# Patient Record
Sex: MALE | Race: WHITE | NOT HISPANIC OR LATINO | Employment: OTHER | URBAN - METROPOLITAN AREA
[De-identification: names, ages, dates, MRNs, and addresses within clinical notes are randomized per-mention and may not be internally consistent; named-entity substitution may affect disease eponyms.]

---

## 2022-05-25 ENCOUNTER — OFFICE VISIT (OUTPATIENT)
Dept: FAMILY MEDICINE CLINIC | Facility: CLINIC | Age: 67
End: 2022-05-25

## 2022-05-25 VITALS
BODY MASS INDEX: 29.25 KG/M2 | HEART RATE: 77 BPM | TEMPERATURE: 97.8 F | HEIGHT: 68 IN | WEIGHT: 193 LBS | DIASTOLIC BLOOD PRESSURE: 72 MMHG | OXYGEN SATURATION: 98 % | SYSTOLIC BLOOD PRESSURE: 118 MMHG

## 2022-05-25 DIAGNOSIS — Z12.5 SCREENING FOR PROSTATE CANCER: ICD-10-CM

## 2022-05-25 DIAGNOSIS — Z76.89 ESTABLISHING CARE WITH NEW DOCTOR, ENCOUNTER FOR: Primary | ICD-10-CM

## 2022-05-25 DIAGNOSIS — R06.02 SOBOE (SHORTNESS OF BREATH ON EXERTION): ICD-10-CM

## 2022-05-25 DIAGNOSIS — Z12.11 SCREEN FOR COLON CANCER: ICD-10-CM

## 2022-05-25 DIAGNOSIS — R41.3 SHORT-TERM MEMORY LOSS: ICD-10-CM

## 2022-05-25 DIAGNOSIS — Z11.59 ENCOUNTER FOR HEPATITIS C SCREENING TEST FOR LOW RISK PATIENT: ICD-10-CM

## 2022-05-25 DIAGNOSIS — R94.31 ABNORMAL EKG: ICD-10-CM

## 2022-05-25 DIAGNOSIS — R00.2 PALPITATIONS: ICD-10-CM

## 2022-05-25 DIAGNOSIS — R53.83 FATIGUE, UNSPECIFIED TYPE: ICD-10-CM

## 2022-05-25 DIAGNOSIS — R26.89 BALANCE PROBLEMS: ICD-10-CM

## 2022-05-25 LAB
ALBUMIN SERPL-MCNC: 4.6 G/DL (ref 3.5–5.2)
ALBUMIN/GLOB SERPL: 2.3 G/DL
ALP SERPL-CCNC: 95 U/L (ref 39–117)
ALT SERPL W P-5'-P-CCNC: 31 U/L (ref 1–41)
ANION GAP SERPL CALCULATED.3IONS-SCNC: 11 MMOL/L (ref 5–15)
AST SERPL-CCNC: 21 U/L (ref 1–40)
BACTERIA UR QL AUTO: ABNORMAL /HPF
BILIRUB SERPL-MCNC: 0.6 MG/DL (ref 0–1.2)
BILIRUB UR QL STRIP: NEGATIVE
BUN SERPL-MCNC: 11 MG/DL (ref 8–23)
BUN/CREAT SERPL: 12.9 (ref 7–25)
CALCIUM SPEC-SCNC: 9.7 MG/DL (ref 8.6–10.5)
CHLORIDE SERPL-SCNC: 105 MMOL/L (ref 98–107)
CHOLEST SERPL-MCNC: 182 MG/DL (ref 0–200)
CLARITY UR: CLEAR
CO2 SERPL-SCNC: 25 MMOL/L (ref 22–29)
COLOR UR: YELLOW
CREAT SERPL-MCNC: 0.85 MG/DL (ref 0.76–1.27)
DEPRECATED RDW RBC AUTO: 41.8 FL (ref 37–54)
EGFRCR SERPLBLD CKD-EPI 2021: 96.4 ML/MIN/1.73
ERYTHROCYTE [DISTWIDTH] IN BLOOD BY AUTOMATED COUNT: 12.8 % (ref 12.3–15.4)
FOLATE SERPL-MCNC: 13.3 NG/ML (ref 4.78–24.2)
GLOBULIN UR ELPH-MCNC: 2 GM/DL
GLUCOSE SERPL-MCNC: 93 MG/DL (ref 65–99)
GLUCOSE UR STRIP-MCNC: NEGATIVE MG/DL
HCT VFR BLD AUTO: 44 % (ref 37.5–51)
HCV AB SER DONR QL: NORMAL
HDLC SERPL-MCNC: 46 MG/DL (ref 40–60)
HGB BLD-MCNC: 14.9 G/DL (ref 13–17.7)
HGB UR QL STRIP.AUTO: NEGATIVE
HYALINE CASTS UR QL AUTO: ABNORMAL /LPF
KETONES UR QL STRIP: NEGATIVE
LDLC SERPL CALC-MCNC: 117 MG/DL (ref 0–100)
LDLC/HDLC SERPL: 2.51 {RATIO}
LEUKOCYTE ESTERASE UR QL STRIP.AUTO: ABNORMAL
MCH RBC QN AUTO: 30.7 PG (ref 26.6–33)
MCHC RBC AUTO-ENTMCNC: 33.9 G/DL (ref 31.5–35.7)
MCV RBC AUTO: 90.7 FL (ref 79–97)
NITRITE UR QL STRIP: NEGATIVE
PH UR STRIP.AUTO: 6.5 [PH] (ref 5–8)
PLATELET # BLD AUTO: 233 10*3/MM3 (ref 140–450)
PMV BLD AUTO: 9.7 FL (ref 6–12)
POTASSIUM SERPL-SCNC: 4.1 MMOL/L (ref 3.5–5.2)
PROT SERPL-MCNC: 6.6 G/DL (ref 6–8.5)
PROT UR QL STRIP: NEGATIVE
PSA SERPL-MCNC: 2.25 NG/ML (ref 0–4)
RBC # BLD AUTO: 4.85 10*6/MM3 (ref 4.14–5.8)
RBC # UR STRIP: ABNORMAL /HPF
REF LAB TEST METHOD: ABNORMAL
SODIUM SERPL-SCNC: 141 MMOL/L (ref 136–145)
SP GR UR STRIP: 1.02 (ref 1–1.03)
SQUAMOUS #/AREA URNS HPF: ABNORMAL /HPF
TRIGL SERPL-MCNC: 102 MG/DL (ref 0–150)
TSH SERPL DL<=0.05 MIU/L-ACNC: 1.16 UIU/ML (ref 0.27–4.2)
UROBILINOGEN UR QL STRIP: ABNORMAL
VIT B12 BLD-MCNC: 542 PG/ML (ref 211–946)
VLDLC SERPL-MCNC: 19 MG/DL (ref 5–40)
WBC # UR STRIP: ABNORMAL /HPF
WBC NRBC COR # BLD: 6.38 10*3/MM3 (ref 3.4–10.8)

## 2022-05-25 PROCEDURE — 82746 ASSAY OF FOLIC ACID SERUM: CPT | Performed by: NURSE PRACTITIONER

## 2022-05-25 PROCEDURE — 93000 ELECTROCARDIOGRAM COMPLETE: CPT | Performed by: NURSE PRACTITIONER

## 2022-05-25 PROCEDURE — 81001 URINALYSIS AUTO W/SCOPE: CPT | Performed by: NURSE PRACTITIONER

## 2022-05-25 PROCEDURE — 82607 VITAMIN B-12: CPT | Performed by: NURSE PRACTITIONER

## 2022-05-25 PROCEDURE — 99204 OFFICE O/P NEW MOD 45 MIN: CPT | Performed by: NURSE PRACTITIONER

## 2022-05-25 PROCEDURE — 80053 COMPREHEN METABOLIC PANEL: CPT | Performed by: NURSE PRACTITIONER

## 2022-05-25 PROCEDURE — 84443 ASSAY THYROID STIM HORMONE: CPT | Performed by: NURSE PRACTITIONER

## 2022-05-25 PROCEDURE — 86803 HEPATITIS C AB TEST: CPT | Performed by: NURSE PRACTITIONER

## 2022-05-25 PROCEDURE — 36415 COLL VENOUS BLD VENIPUNCTURE: CPT | Performed by: NURSE PRACTITIONER

## 2022-05-25 PROCEDURE — 85027 COMPLETE CBC AUTOMATED: CPT | Performed by: NURSE PRACTITIONER

## 2022-05-25 PROCEDURE — G0103 PSA SCREENING: HCPCS | Performed by: NURSE PRACTITIONER

## 2022-05-25 PROCEDURE — 80061 LIPID PANEL: CPT | Performed by: NURSE PRACTITIONER

## 2022-05-25 NOTE — PROGRESS NOTES
Chief Complaint  Establish Care and Memory Loss    Subjective            Toy Marroquin presents to [facility]  Here for the establishment of care with PCP--    Then also memory issues--pt still has great LTM--then the STM issues were starting approx 2 yrs ago--    Their daughter passed away almost 5 yrs ago--    olest brother just Dx with dementia       PHQ-2 Total Score: 8  PHQ-9 Total Score: 8    Past Medical History:   Diagnosis Date   • History of meningitis    • HL (hearing loss)        No Known Allergies     Past Surgical History:   Procedure Laterality Date   • INNER EAR SURGERY Right         Social History     Tobacco Use   • Smoking status: Never Smoker   • Smokeless tobacco: Never Used   Vaping Use   • Vaping Use: Never used       Family History   Problem Relation Age of Onset   • Dementia Mother    • Cancer Mother    • Stroke Father    • Heart disease Father    • Dementia Brother    • Early death Daughter    • Cancer Daughter         Health Maintenance Due   Topic Date Due   • COLORECTAL CANCER SCREENING  Never done   • ANNUAL PHYSICAL  Never done   • COVID-19 Vaccine (4 - Booster for Moderna series) 04/12/2022   • HEPATITIS C SCREENING  Never done        No current outpatient medications on file prior to visit.     No current facility-administered medications on file prior to visit.       Immunization History   Administered Date(s) Administered   • COVID-19 (MODERNA) 1st, 2nd, 3rd Dose Only 03/25/2021, 04/22/2021, 12/12/2021       Review of Systems   Constitutional: Positive for appetite change and fatigue. Negative for activity change, chills and fever.        Food does not interest me    HENT: Negative.  Negative for trouble swallowing.         Nasally small holes    Eyes: Positive for blurred vision. Negative for double vision and visual disturbance.        Is supposed to wear glasses and not worn them in 3 yrs    Respiratory: Positive for shortness of breath.          "SOBOE    Cardiovascular: Positive for palpitations.        Intermittently at times --few weeks ago   Gastrointestinal: Positive for diarrhea. Negative for abdominal pain, blood in stool, constipation, nausea and vomiting.        Occasionally    Endocrine: Negative for polydipsia, polyphagia and polyuria.   Genitourinary: Negative for dysuria and nocturia.   Musculoskeletal: Positive for back pain. Negative for arthralgias and myalgias.        Intermittently at times --few weeks ago --upper back right side --with the palpitations    Skin: Negative for rash and wound.   Allergic/Immunologic: Positive for environmental allergies.   Neurological: Positive for memory problem and confusion. Negative for dizziness, tremors, seizures, syncope, light-headedness and headache.   Psychiatric/Behavioral: Positive for decreased concentration and depressed mood. Negative for behavioral problems, self-injury and suicidal ideas. The patient is nervous/anxious.         Objective     /72   Pulse 77   Temp 97.8 °F (36.6 °C)   Ht 172.7 cm (68\")   Wt 87.5 kg (193 lb)   SpO2 98%   BMI 29.35 kg/m²       Physical Exam  Vitals and nursing note reviewed. Exam conducted with a chaperone present (exwife).   Constitutional:       Appearance: Normal appearance.   HENT:      Head: Normocephalic.      Right Ear: Tympanic membrane normal.      Left Ear: Tympanic membrane normal.      Nose: Nose normal.      Mouth/Throat:      Mouth: Mucous membranes are moist.   Eyes:      Pupils: Pupils are equal, round, and reactive to light.   Cardiovascular:      Rate and Rhythm: Normal rate and regular rhythm.      Pulses:           Carotid pulses are 2+ on the right side and 2+ on the left side.     Heart sounds: Normal heart sounds. No murmur heard.  Pulmonary:      Effort: Pulmonary effort is normal.      Breath sounds: Normal breath sounds.   Abdominal:      General: Bowel sounds are normal.      Palpations: Abdomen is soft.   Musculoskeletal:   "       General: Normal range of motion.      Cervical back: Normal range of motion and neck supple.      Right lower leg: No edema.      Left lower leg: No edema.   Skin:     General: Skin is warm and dry.   Neurological:      Mental Status: He is alert and oriented to person, place, and time.      Sensory: Sensation is intact.      Motor: Motor function is intact.      Coordination: Romberg sign negative.      Gait: Gait is intact.   Psychiatric:         Mood and Affect: Mood normal.         Behavior: Behavior normal.         Thought Content: Thought content normal.      Comments: Patient passed both of the Mini-Mental exams         Result Review :             XR Chest PA & Lateral (In Office) (05/25/2022 10:05)  See Mini mental exam scanned in from today       ECG 12 Lead    Date/Time: 5/25/2022 10:24 AM  Performed by: Moni Erwin APRN  Authorized by: Moni Erwin APRN   Comparison: not compared with previous ECG   Previous ECG: no previous ECG available  Rhythm: sinus rhythm  Rate: normal  Conduction: left anterior fascicular block  ST Segments: ST segments normal  T Waves: T waves normal  QRS axis: normal  Other: no other findings    Clinical impression: abnormal EKG  Comments: Per EKG                 Assessment and Plan      Diagnoses and all orders for this visit:    1. Establishing care with new doctor, encounter for (Primary)    2. Short-term memory loss  -     TSH  -     PSA Screen  -     Vitamin B12 & Folate  -     Urinalysis With Culture If Indicated - Urine, Clean Catch  -     Hepatitis C Antibody  -     Comprehensive Metabolic Panel  -     Lipid Panel  -     CBC (No Diff)  -     CT Head Without Contrast; Future    3. Screen for colon cancer  -     Cologuard - Stool, Per Rectum; Future    4. Palpitations  -     ECG 12 Lead  -     XR Chest PA & Lateral (In Office)  -     TSH  -     Vitamin B12 & Folate  -     Urinalysis With Culture If Indicated - Urine, Clean Catch  -     Comprehensive  Metabolic Panel  -     Lipid Panel  -     CBC (No Diff)  -     Ambulatory Referral to Cardiology    5. SOBOE (shortness of breath on exertion)  -     ECG 12 Lead  -     XR Chest PA & Lateral (In Office)  -     TSH  -     Vitamin B12 & Folate  -     Urinalysis With Culture If Indicated - Urine, Clean Catch  -     Comprehensive Metabolic Panel  -     Lipid Panel  -     CBC (No Diff)  -     Ambulatory Referral to Cardiology    6. Fatigue, unspecified type  -     ECG 12 Lead  -     XR Chest PA & Lateral (In Office)  -     TSH  -     PSA Screen  -     Vitamin B12 & Folate  -     Urinalysis With Culture If Indicated - Urine, Clean Catch  -     Hepatitis C Antibody  -     Comprehensive Metabolic Panel  -     Lipid Panel  -     CBC (No Diff)  -     Ambulatory Referral to Cardiology    7. Encounter for hepatitis C screening test for low risk patient  -     Hepatitis C Antibody    8. Screening for prostate cancer  -     PSA Screen    9. Balance problems  -     CT Head Without Contrast; Future    10. Abnormal EKG  -     Ambulatory Referral to Cardiology        I spent 48 minutes caring for Toy on this date of service. This time includes time spent by me in the following activities:preparing for the visit, reviewing tests, obtaining and/or reviewing a separately obtained history, performing a medically appropriate examination and/or evaluation , counseling and educating the patient/family/caregiver, ordering medications, tests, or procedures, referring and communicating with other health care professionals , documenting information in the medical record and independently interpreting results and communicating that information with the patient/family/caregiver    Follow Up     Return in about 2 weeks (around 6/8/2022), or if symptoms worsen or fail to improve.

## 2022-05-25 NOTE — PROGRESS NOTES
Venipuncture Blood Specimen Collection  Venipuncture performed in LEFT ARM  by Alma Herman with good hemostasis. Patient tolerated the procedure well without complications.   05/25/22   Alma Herman

## 2022-05-25 NOTE — PROGRESS NOTES
Please mail letter to patient stating    Mr. Marroquin, the folic acid and the vitamin B12 were normal range the hepatitis C antibody screening was completely negative; thyroid levels were normal range; the PSA for the prostate cancer screening was in normal range; the urinalysis was all normal except trace leukocytes and microscopically just the trace leukocytes and no bacteria seen; the comprehensive panel reveals normal glucose and normal electrolytes and normal kidney and liver function tests; the cholesterol levels revealed total cholesterol triglycerides and HDL all normal range and then the LDL was modestly elevated at 117 it should be less than 100 when fasting; and lastly your blood counts were completely normal range

## 2022-06-07 ENCOUNTER — APPOINTMENT (OUTPATIENT)
Dept: CT IMAGING | Facility: HOSPITAL | Age: 67
End: 2022-06-07

## 2022-07-13 ENCOUNTER — APPOINTMENT (OUTPATIENT)
Dept: CT IMAGING | Facility: HOSPITAL | Age: 67
End: 2022-07-13

## 2022-07-14 ENCOUNTER — APPOINTMENT (OUTPATIENT)
Dept: CT IMAGING | Facility: HOSPITAL | Age: 67
End: 2022-07-14

## 2022-07-14 ENCOUNTER — HOSPITAL ENCOUNTER (OUTPATIENT)
Dept: CT IMAGING | Facility: HOSPITAL | Age: 67
Discharge: HOME | End: 2022-07-14
Admitting: NURSE PRACTITIONER

## 2022-07-14 DIAGNOSIS — R26.89 BALANCE PROBLEMS: ICD-10-CM

## 2022-07-14 DIAGNOSIS — R41.3 SHORT-TERM MEMORY LOSS: ICD-10-CM

## 2022-07-14 PROBLEM — R06.02 SOB (SHORTNESS OF BREATH): Status: ACTIVE | Noted: 2022-07-14

## 2022-07-14 PROBLEM — R00.2 PALPITATIONS: Status: ACTIVE | Noted: 2022-07-14

## 2022-07-14 PROCEDURE — 70450 CT HEAD/BRAIN W/O DYE: CPT

## 2022-07-14 NOTE — PROGRESS NOTES
Please mail letter to patient stating    Mr. Marroquin, the CT of the head shows no acute intracranial process--and there was an old lacunar infarct within the left cerebellum--but this was an old prior 1 --nothing new--please let me know if any of your memory issues persist and we can always get you referred to the neurologist for further evaluation

## 2022-07-14 NOTE — PROGRESS NOTES
"Chief Complaint  Shortness of Breath and Palpitations      History of Present Illness  Toy Marroquin presents to [facility]  Patient is a 67-year-old with a issue with memory loss over about the last few years as well as balance issues.  He has also had increased heart palpitations noticed in the last 6 months or so with some associated generalized fatigue.  He does report shortness of breath occurring both sporadically but also at times with exertion no lower extreme edema PND orthopnea.  He has not had any syncopal spells      Past Medical History:   Diagnosis Date   • History of meningitis    • HL (hearing loss)        No current outpatient medications on file.    There are no discontinued medications.  No Known Allergies     Social History     Tobacco Use   • Smoking status: Never Smoker   • Smokeless tobacco: Never Used   Vaping Use   • Vaping Use: Never used   Substance Use Topics   • Alcohol use: Yes     Comment: occ   • Drug use: Yes     Types: Marijuana       Family History   Problem Relation Age of Onset   • Dementia Mother    • Cancer Mother    • Stroke Father    • Heart disease Father    • Dementia Brother    • Early death Daughter    • Cancer Daughter         Objective     /95   Pulse 61   Ht 172.7 cm (68\")   Wt 87 kg (191 lb 12.8 oz)   BMI 29.16 kg/m²       Physical Exam    General Appearance:   · no acute distress  · Alert and oriented x3  HENT:   · lips not cyanotic  · Atraumatic  Neck:  · No jvd   · supple  Respiratory:  · no respiratory distress  · normal breath sounds  · no rales  Cardiovascular:  · Regular rate and rhythm  · no S3, no S4   · no murmur  · no rub  Extremities  · No cyanosis  · lower extremity edema: none    Skin:   · warm, dry  · No rashes    Result Review :     No results found for: PROBNP  CMP    CMP 5/25/22   Glucose 93   BUN 11   Creatinine 0.85   Sodium 141   Potassium 4.1   Chloride 105   Calcium 9.7   Albumin 4.60   Total Bilirubin " 0.6   Alkaline Phosphatase 95   AST (SGOT) 21   ALT (SGPT) 31           CBC w/diff    CBC w/Diff 5/25/22   WBC 6.38   RBC 4.85   Hemoglobin 14.9   Hematocrit 44.0   MCV 90.7   MCH 30.7   MCHC 33.9   RDW 12.8   Platelets 233            Lab Results   Component Value Date    TSH 1.160 05/25/2022      No results found for: FREET4   No results found for: DDIMERQUANT  No results found for: MG   No results found for: DIGOXIN   No results found for: TROPONINT   No results found for: POCTROP(       Lipid Panel    Lipid Panel 5/25/22   Total Cholesterol 182   Triglycerides 102   HDL Cholesterol 46   VLDL Cholesterol 19   LDL Cholesterol  117 (A)   LDL/HDL Ratio 2.51   (A) Abnormal value                   Chest x-ray 5/22 no acute disease      No results found for this or any previous visit.             The ASCVD Risk score (Spring CORRALES Jr., et al., 2013) failed to calculate for the following reasons:    The patient has a prior MI or stroke diagnosis     Diagnoses and all orders for this visit:    1. SOB (shortness of breath) (Primary)  Assessment & Plan:  Patient with intermittent symptoms suspect more from deconditioning checking echocardiogram for any evidence of CHF    Orders:  -     Adult Transthoracic Echo Complete W/ Cont if Necessary Per Protocol; Future  -     Holter Monitor - 24 Hour; Future    2. Palpitations  Assessment & Plan:  Patient with intermittent heart palpitations likely benign PACs or PVCs given his lacunar infarct would recommend 30-day event monitor to screen for atrial fibrillation as well as a echocardiogram.      3. Lacunar infarction (HCC)  Assessment & Plan:  Patient with old lacunar infarct in cerebellum seen on CT scan recommend chronic aspirin 81 mg once a day.  In addition discussed with patient possible statin treatment for goal LDL less than 70 he was going to follow-up with his PCP to discuss whether or not to start.  In additionwill check carotid duplex            Follow Up     No follow-ups on  file.          Patient was given instructions and counseling regarding his condition or for health maintenance advice. Please see specific information pulled into the AVS if appropriate.

## 2022-07-15 ENCOUNTER — OFFICE VISIT (OUTPATIENT)
Dept: CARDIOLOGY | Facility: CLINIC | Age: 67
End: 2022-07-15

## 2022-07-15 VITALS
HEIGHT: 68 IN | BODY MASS INDEX: 29.07 KG/M2 | HEART RATE: 61 BPM | WEIGHT: 191.8 LBS | SYSTOLIC BLOOD PRESSURE: 138 MMHG | DIASTOLIC BLOOD PRESSURE: 95 MMHG

## 2022-07-15 DIAGNOSIS — R06.02 SOB (SHORTNESS OF BREATH): Primary | ICD-10-CM

## 2022-07-15 DIAGNOSIS — R00.2 PALPITATIONS: ICD-10-CM

## 2022-07-15 DIAGNOSIS — I63.81 LACUNAR INFARCTION: ICD-10-CM

## 2022-07-15 PROCEDURE — 99204 OFFICE O/P NEW MOD 45 MIN: CPT | Performed by: INTERNAL MEDICINE

## 2022-07-15 NOTE — ASSESSMENT & PLAN NOTE
Patient with intermittent symptoms suspect more from deconditioning checking echocardiogram for any evidence of CHF

## 2022-07-15 NOTE — ASSESSMENT & PLAN NOTE
Patient with old lacunar infarct in cerebellum seen on CT scan recommend chronic aspirin 81 mg once a day.  In addition discussed with patient possible statin treatment for goal LDL less than 70 he was going to follow-up with his PCP to discuss whether or not to start.  In additionwill check carotid duplex

## 2022-07-15 NOTE — ASSESSMENT & PLAN NOTE
Patient with intermittent heart palpitations likely benign PACs or PVCs given his lacunar infarct would recommend 30-day event monitor to screen for atrial fibrillation as well as a echocardiogram.

## 2022-07-25 ENCOUNTER — OFFICE VISIT (OUTPATIENT)
Dept: FAMILY MEDICINE CLINIC | Facility: CLINIC | Age: 67
End: 2022-07-25

## 2022-07-25 VITALS
BODY MASS INDEX: 30.13 KG/M2 | HEIGHT: 67 IN | SYSTOLIC BLOOD PRESSURE: 126 MMHG | HEART RATE: 67 BPM | DIASTOLIC BLOOD PRESSURE: 72 MMHG | OXYGEN SATURATION: 97 % | TEMPERATURE: 96.7 F | WEIGHT: 192 LBS

## 2022-07-25 DIAGNOSIS — H54.7 VISION IMPAIRMENT: ICD-10-CM

## 2022-07-25 DIAGNOSIS — R51.9 INTERMITTENT HEADACHE: ICD-10-CM

## 2022-07-25 DIAGNOSIS — Z86.61 HISTORY OF MENINGITIS: ICD-10-CM

## 2022-07-25 DIAGNOSIS — R41.3 SHORT-TERM MEMORY LOSS: ICD-10-CM

## 2022-07-25 DIAGNOSIS — R26.89 BALANCE PROBLEMS: ICD-10-CM

## 2022-07-25 DIAGNOSIS — Z86.69 PERSONAL HISTORY OF HEARING LOSS: ICD-10-CM

## 2022-07-25 DIAGNOSIS — I63.81 LACUNAR INFARCTION: Primary | ICD-10-CM

## 2022-07-25 PROCEDURE — 99214 OFFICE O/P EST MOD 30 MIN: CPT | Performed by: NURSE PRACTITIONER

## 2022-07-25 RX ORDER — ASPIRIN 81 MG/1
81 TABLET ORAL DAILY
COMMUNITY

## 2022-07-25 NOTE — PROGRESS NOTES
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 and been to multiple ENT's in the past     And pt also needs the referral to the eye MD--and was told in the past to see an actual ophthalmologist --wore glasses in the past       PHQ-2 Total Score:    PHQ-9 Total Score:      Past Medical History:   Diagnosis Date   • History of meningitis    • HL (hearing loss)        No Known Allergies     Past Surgical History:   Procedure Laterality Date   • INNER EAR SURGERY Right         Social History     Tobacco Use   • Smoking status: Never Smoker   • Smokeless tobacco: Never Used   Vaping Use   • Vaping Use: Never used   Substance Use Topics   • Alcohol use: Yes     Comment: occ   • Drug use: Yes     Types: Marijuana       Family History   Problem Relation Age of Onset   • Dementia Mother    • Cancer Mother    • Stroke Father    • Heart disease Father    • Dementia Brother    • Early death Daughter    • Cancer Daughter         Health Maintenance Due   Topic Date Due   • COLORECTAL CANCER SCREENING  Never done   • COVID-19 Vaccine (4 - Booster for Moderna series) 04/12/2022   • ANNUAL WELLNESS VISIT  Never done        Current Outpatient Medications on File Prior to Visit   Medication Sig   • aspirin 81 MG EC tablet Take 81 mg by mouth Daily.     No current facility-administered medications on file prior to visit.       Immunization History   Administered Date(s) Administered   • COVID-19 (MODERNA) 1st, 2nd, 3rd Dose Only 03/25/2021, 04/22/2021, 12/12/2021       Review of Systems   Constitutional: Positive for fatigue.   HENT: Positive for hearing loss.  "Negative for trouble swallowing.         Hx of only \"one eardrum--and always had issues with balance\" --and the hearing loss--and pt reports prior Hx of the spinal meningitis at age 18 months old and then a \"draining of the TM\" -- right sided and then permanent hearing loss to that ear    Eyes: Negative for blurred vision and double vision.   Respiratory: Positive for shortness of breath. Negative for choking.         Pt reports not been too back lately    Cardiovascular: Negative for chest pain, palpitations and leg swelling.   Genitourinary: Negative for dysuria.   Musculoskeletal: Positive for arthralgias.        Left knee minimally    Neurological: Positive for headache and memory problem. Negative for dizziness, seizures and light-headedness.   Hematological: Does not bruise/bleed easily.   Psychiatric/Behavioral: Negative for self-injury, suicidal ideas and depressed mood. The patient is not nervous/anxious.         Objective     /72 (BP Location: Left arm, Patient Position: Sitting, Cuff Size: Adult)   Pulse 67   Temp 96.7 °F (35.9 °C) (Temporal)   Ht 170.2 cm (67\")   Wt 87.1 kg (192 lb)   SpO2 97%   BMI 30.07 kg/m²       Physical Exam  Vitals and nursing note reviewed. Exam conducted with a chaperone present (ex-wife ).   Constitutional:       Appearance: Normal appearance.   HENT:      Head: Normocephalic.      Right Ear: Tympanic membrane, ear canal and external ear normal.      Left Ear: Tympanic membrane, ear canal and external ear normal.      Ears:      Comments: permament hearing loss right side      Nose: Nose normal.      Mouth/Throat:      Comments: Wearing mask  Eyes:      Pupils: Pupils are equal, round, and reactive to light.   Cardiovascular:      Rate and Rhythm: Normal rate and regular rhythm.      Pulses:           Carotid pulses are 2+ on the right side and 2+ on the left side.     Heart sounds: Normal heart sounds. No murmur heard.  Pulmonary:      Effort: Pulmonary effort is " normal.      Breath sounds: Normal breath sounds.   Abdominal:      Palpations: Abdomen is soft.   Musculoskeletal:         General: Normal range of motion.      Cervical back: Normal range of motion and neck supple.      Right lower leg: No edema.      Left lower leg: No edema.   Skin:     General: Skin is warm and dry.   Neurological:      Mental Status: He is alert and oriented to person, place, and time.   Psychiatric:         Mood and Affect: Mood normal.         Behavior: Behavior normal.         Thought Content: Thought content normal.         Judgment: Judgment normal.         Result Review :             TSH (05/25/2022 10:13)  PSA Screen (05/25/2022 10:13)  Vitamin B12 & Folate (05/25/2022 10:13)  Urinalysis With Culture If Indicated - Urine, Clean Catch (05/25/2022 10:13)  Hepatitis C Antibody (05/25/2022 10:13)  Comprehensive Metabolic Panel (05/25/2022 10:13)  Lipid Panel (05/25/2022 10:13)  CBC (No Diff) (05/25/2022 10:13)  Urinalysis, Microscopic Only - Urine, Clean Catch (05/25/2022 10:13)  Cologuard - Stool, Per Rectum (06/13/2022)  Office Visit with Kvng Cummins MD (07/15/2022)  ECG 12 Lead (05/25/2022 10:24)               Assessment and Plan      Diagnoses and all orders for this visit:    1. Lacunar infarction (HCC) (Primary)  Comments:  past/old per CT scan  Orders:  -     Ambulatory Referral to Neurology    2. Short-term memory loss  -     Ambulatory Referral to Neurology    3. Intermittent headache  -     Ambulatory Referral to Neurology    4. Balance problems  -     Ambulatory Referral to Neurology    5. History of meningitis  Comments:  at 18 months old    6. Personal history of hearing loss  Comments:  at 18 months old     7. Vision impairment  -     Ambulatory Referral to Ophthalmology    then also the modest elevated LDL--and pt without any Hx of HTN or DM--I will refer the pt to neurology and they will further eval and then we will get the results from all cardiology is ordering--then if  statin indicated--they will start    Then upcoming tests are:  Adult Transthoracic Echo Complete W/ Cont if Necessary Per Protocol (07/15/2022 11:57)  Holter Monitor - 24 Hour (07/15/2022 11:57)  Duplex Carotid Ultrasound CAR (07/15/2022 12:20)  Cardiac Event Monitor (07/15/2022 12:20)           Follow Up     Return if symptoms worsen or fail to improve.

## 2022-08-29 ENCOUNTER — TELEPHONE (OUTPATIENT)
Dept: CARDIOLOGY | Facility: CLINIC | Age: 67
End: 2022-08-29

## 2022-08-31 ENCOUNTER — TELEPHONE (OUTPATIENT)
Dept: CARDIOLOGY | Facility: CLINIC | Age: 67
End: 2022-08-31

## 2022-08-31 NOTE — TELEPHONE ENCOUNTER
Patient called and wanted to know results.  We discussed results and patient stated understanding. No other issues or concerns noted currently per patient.    He was wanting to know when to follow up or if he needed to be seen sooner that 6 months?  Please advise.  Thank you.

## 2022-08-31 NOTE — TELEPHONE ENCOUNTER
----- Message from Shannon Dillard sent at 8/30/2022  2:29 PM EDT -----    ----- Message -----  From: Renuka Taylor APRN  Sent: 8/30/2022   2:03 PM EDT  To: Shannon Dillard    Notify pt that Carotid Doppler examination shows bilateral plaquing in the internal carotid arteries and common carotid arteries.  No significant carotid stenosis noted

## 2022-09-02 ENCOUNTER — TELEPHONE (OUTPATIENT)
Dept: CARDIOLOGY | Facility: CLINIC | Age: 67
End: 2022-09-02

## 2022-09-02 NOTE — TELEPHONE ENCOUNTER
----- Message from ADRIENNE Gallagher sent at 9/2/2022  9:42 AM EDT -----  Notify pt echo result: EF 62% and no valve disease noted, no cause found for shortness of breath. She can follow up in 6 months with KALEB DELEON or Dr Cummins

## 2022-09-02 NOTE — TELEPHONE ENCOUNTER
Left detailed voicemail with results and plan. Also ask that pt call back to schedule 6 month f/u jocelyn.

## 2022-09-13 ENCOUNTER — OFFICE VISIT (OUTPATIENT)
Dept: FAMILY MEDICINE CLINIC | Facility: CLINIC | Age: 67
End: 2022-09-13

## 2022-09-13 VITALS
BODY MASS INDEX: 29.51 KG/M2 | WEIGHT: 188 LBS | TEMPERATURE: 96.9 F | HEIGHT: 67 IN | OXYGEN SATURATION: 97 % | DIASTOLIC BLOOD PRESSURE: 64 MMHG | HEART RATE: 86 BPM | SYSTOLIC BLOOD PRESSURE: 128 MMHG

## 2022-09-13 DIAGNOSIS — R41.3 SHORT-TERM MEMORY LOSS: ICD-10-CM

## 2022-09-13 DIAGNOSIS — E78.2 MIXED HYPERLIPIDEMIA: Primary | ICD-10-CM

## 2022-09-13 DIAGNOSIS — I65.23 CAROTID ARTERY PLAQUE, BILATERAL: ICD-10-CM

## 2022-09-13 PROCEDURE — 99214 OFFICE O/P EST MOD 30 MIN: CPT | Performed by: NURSE PRACTITIONER

## 2022-09-13 RX ORDER — ATORVASTATIN CALCIUM 10 MG/1
10 TABLET, FILM COATED ORAL NIGHTLY
Qty: 90 TABLET | Refills: 0 | Status: SHIPPED | OUTPATIENT
Start: 2022-09-13 | End: 2022-12-15 | Stop reason: SDUPTHER

## 2022-09-13 NOTE — PROGRESS NOTES
Chief Complaint  Follow-up    Subjective            Toy Marroquin presents to [facility]  Patient and his ex-wife are here today with regards to the follow-up on the recent cardiology evaluation and they also did a recent carotid ultrasound and did show some carotid plaques but no stenosis    Patient brought in with him red rice capsules wondering if this would be helpful with his cholesterol as it was the only abnormal was the LDL at 117 everything else on his labs were completely normal    I did calculate his cardiovascular risk for related event over the next 10 years and surely because of his age alone and the LDL modestly elevated places him at just over 12% risk and I explained to him and his ex-wife that anyone 5% or higher should start a statin as well as a low-cholesterol diet--and they were very interested in starting this and waiting on the red rice capsules and I explained with regards to the follow-up at 3months --we will place the orders for the follow-up labs in early December he can follow-up the next week and we will see how well the lowest dose of Lipitor has affected the cholesterol    Patient also has an upcoming neurology appointment with regards to the family history of dementia and his onset of memory issues that began this entire work-up      PHQ-2 Total Score: 0  PHQ-9 Total Score: 0    Past Medical History:   Diagnosis Date   • History of meningitis    • HL (hearing loss)        No Known Allergies     Past Surgical History:   Procedure Laterality Date   • INNER EAR SURGERY Right         Social History     Tobacco Use   • Smoking status: Never Smoker   • Smokeless tobacco: Never Used   Vaping Use   • Vaping Use: Never used   Substance Use Topics   • Alcohol use: Yes     Comment: occ   • Drug use: Yes     Types: Marijuana       Family History   Problem Relation Age of Onset   • Dementia Mother    • Cancer Mother    • Stroke Father    • Heart disease Father   "  • Dementia Brother    • Early death Daughter    • Cancer Daughter         Health Maintenance Due   Topic Date Due   • COVID-19 Vaccine (4 - Booster for Moderna series) 04/12/2022   • ANNUAL WELLNESS VISIT  Never done        Current Outpatient Medications on File Prior to Visit   Medication Sig   • aspirin 81 MG EC tablet Take 81 mg by mouth Daily.     No current facility-administered medications on file prior to visit.       Immunization History   Administered Date(s) Administered   • COVID-19 (MODERNA) 1st, 2nd, 3rd Dose Only 03/25/2021, 04/22/2021, 12/12/2021       Review of Systems   Constitutional: Negative for chills, fatigue and fever.   HENT: Negative for trouble swallowing.    Eyes: Negative for blurred vision, double vision and visual disturbance.   Respiratory: Negative for choking and shortness of breath.    Cardiovascular: Negative for chest pain.   Gastrointestinal: Negative for abdominal pain and blood in stool.   Genitourinary: Negative for dysuria.   Musculoskeletal: Negative.    Skin: Negative.    Allergic/Immunologic: Positive for environmental allergies.   Neurological: Positive for headache and memory problem. Negative for dizziness, syncope and light-headedness.   Psychiatric/Behavioral: Negative for self-injury, suicidal ideas and depressed mood. The patient is nervous/anxious.         Objective     /64 (BP Location: Left arm, Patient Position: Sitting, Cuff Size: Adult)   Pulse 86   Temp 96.9 °F (36.1 °C) (Temporal)   Ht 170.2 cm (67\")   Wt 85.3 kg (188 lb)   SpO2 97%   BMI 29.44 kg/m²       Physical Exam  Vitals and nursing note reviewed. Exam conducted with a chaperone present (ex-wife).   Constitutional:       Appearance: Normal appearance.   HENT:      Head: Normocephalic.      Right Ear: External ear normal.      Left Ear: External ear normal.      Nose: Nose normal.      Mouth/Throat:      Comments: Wearing mask  Eyes:      Pupils: Pupils are equal, round, and reactive to " light.   Cardiovascular:      Rate and Rhythm: Normal rate and regular rhythm.      Pulses:           Carotid pulses are 2+ on the right side and 2+ on the left side.     Heart sounds: Normal heart sounds. No murmur heard.  Pulmonary:      Effort: Pulmonary effort is normal.      Breath sounds: Normal breath sounds.   Musculoskeletal:         General: Normal range of motion.      Cervical back: Normal range of motion and neck supple.      Right lower leg: No edema.      Left lower leg: No edema.   Skin:     General: Skin is warm and dry.   Neurological:      Mental Status: He is alert and oriented to person, place, and time.   Psychiatric:         Mood and Affect: Mood normal.         Behavior: Behavior normal.         Thought Content: Thought content normal.         Judgment: Judgment normal.         Result Review :           Duplex Carotid Ultrasound CAR (08/30/2022 13:34)  Lipid Panel (05/25/2022 10:13)                 Assessment and Plan      Diagnoses and all orders for this visit:    1. Mixed hyperlipidemia (Primary)  -     Comprehensive Metabolic Panel; Future  -     Lipid Panel; Future  -     atorvastatin (Lipitor) 10 MG tablet; Take 1 tablet by mouth Every Night.  Dispense: 90 tablet; Refill: 0    2. Carotid artery plaque, bilateral  -     Comprehensive Metabolic Panel; Future  -     Lipid Panel; Future  -     atorvastatin (Lipitor) 10 MG tablet; Take 1 tablet by mouth Every Night.  Dispense: 90 tablet; Refill: 0    3. Short-term memory loss  Comments:  pt seeing the neurologist and pt is taking the prenagen OTC--as he reports when he stopped this he could tell a difference and felt it helked     Patient reports very encouraged that one by one with all the testing things are checking out good and then he will proceed with the referred appointment with neurology for further evaluation regarding his memory    We will start the lowest dose of atorvastatin since his cardiovascular risk placed him at 12% over  the next 10 years for related event and we will recheck this lab early December and he will follow-up 1 week later to discuss the results and response to the atorvastatin-I did advise him to wait on starting an over-the-counter supplement for cholesterol such as this red rice capsule as if he had an allergic type reaction of some sort would not know which caused it        Follow Up     Return in about 3 months (around 12/13/2022), or if symptoms worsen or fail to improve, for Recheck, fasting labs and refills.

## 2022-10-05 ENCOUNTER — OFFICE VISIT (OUTPATIENT)
Dept: NEUROLOGY | Facility: CLINIC | Age: 67
End: 2022-10-05

## 2022-10-05 VITALS
HEIGHT: 67 IN | HEART RATE: 66 BPM | BODY MASS INDEX: 29.03 KG/M2 | DIASTOLIC BLOOD PRESSURE: 66 MMHG | SYSTOLIC BLOOD PRESSURE: 122 MMHG | WEIGHT: 185 LBS

## 2022-10-05 DIAGNOSIS — R41.3 MEMORY DIFFICULTY: Primary | ICD-10-CM

## 2022-10-05 DIAGNOSIS — I63.9 CEREBELLAR INFARCT: ICD-10-CM

## 2022-10-05 DIAGNOSIS — I63.81 LACUNAR INFARCTION: ICD-10-CM

## 2022-10-05 PROCEDURE — 99215 OFFICE O/P EST HI 40 MIN: CPT | Performed by: NURSE PRACTITIONER

## 2022-10-05 NOTE — PROGRESS NOTES
"Chief Complaint  Neurologic Problem    Subjective          Toy Marroquin presents to [facility]  History of Present Illness  He is being evaluated today for concerns regarding memory.  He feels that memory loss began 3 years ago.  Family Has noticed any memory difficulty.  Is having difficulty with short term memory.  Endorses difficulty handling financial affairs, such as balancing checkbook and paying bills timely.  Does Not feel as if he could learn a new tool, appliance or gadget.  Denies decreased interest in hobbies or activities.  Endorses depression.  Denies homicidal ideation and suicidal ideation.  Does experience repetitive questioning. Endorses difficulty with judgment and impulsivity    Does not live alone.  Does drive.  Endorses getting lost while driving    Endorses family history of Alzheimer's Disease, brother     States that his daughter passed away 5 years ago.  Significant other states that he gets confused about the day and sometimes the time.       Objective   Vital Signs:   /66   Pulse 66   Ht 170.2 cm (67\")   Wt 83.9 kg (185 lb)   BMI 28.98 kg/m²     Physical Exam  HENT:      Head: Normocephalic.   Pulmonary:      Effort: Pulmonary effort is normal.   Neurological:      Mental Status: He is alert and oriented to person, place, and time.      Cranial Nerves: Cranial nerves are intact.      Sensory: Sensation is intact.      Motor: Motor function is intact.      Coordination: Coordination is intact.      Deep Tendon Reflexes: Reflexes are normal and symmetric.        Neurologic Exam     Mental Status   Oriented to person, place, and time.        Result Review :             MoCA 28/30    CT Head:   There is no acute intracranial hemorrhage or extra-axial collection. The ventricles appear normal in caliber, with no evidence of mass effect or midline shift. The basal cisterns are patent. The gray-white differentiation is preserved.  There is " an old lacunar infarct in the left cerebellum.     The calvarium is intact. The paranasal sinuses and mastoid air cells are well-aerated.    Assessment and Plan    Diagnoses and all orders for this visit:    1. Memory difficulty (Primary)  Assessment & Plan:  Will order MRI brain for further evaluation of memory loss. If results inconclusive, will order formal neurocognitive evaluation.     Orders:  -     MRI Brain With & Without Contrast; Future    2. Cerebellar infarct (HCC)  -     MRI Brain With & Without Contrast; Future  -     CT Angiogram Head; Future  -     CT Angiogram Neck With & Without Contrast; Future    3. Lacunar infarction (HCC)  Assessment & Plan:  Left cerebellar lacunar infarct noted on CT head.  Will order MRI brain and CTA head.neck for further evaluation.  Continue aspirin/atorvastatin for secondary stroke risk reduction.       I spent 45 minutes caring for Toy on this date of service. This time includes time spent by me in the following activities:preparing for the visit, reviewing tests, obtaining and/or reviewing a separately obtained history, performing a medically appropriate examination and/or evaluation , counseling and educating the patient/family/caregiver, ordering medications, tests, or procedures, documenting information in the medical record and independently interpreting results and communicating that information with the patient/family/caregiver  Follow Up   Return in about 6 weeks (around 11/16/2022) for memory f/u, stroke f/u.  Patient was given instructions and counseling regarding his condition or for health maintenance advice. Please see specific information pulled into the AVS if appropriate.

## 2022-10-05 NOTE — PATIENT INSTRUCTIONS
Stroke Prevention  Some medical conditions and behaviors can lead to a higher chance of having a stroke. You can help prevent a stroke by eating healthy, exercising, not smoking, and managing any medical conditions you have.  Stroke is a leading cause of functional impairment. Primary prevention is particularly important because a majority of strokes are first-time events. Stroke changes the lives of not only those who experience a stroke but also their family and other caregivers.  How can this condition affect me?  A stroke is a medical emergency and should be treated right away. A stroke can lead to brain damage and can sometimes be life-threatening. If a person gets medical treatment right away, there is a better chance of surviving and recovering from a stroke.  What can increase my risk?  The following medical conditions may increase your risk of a stroke:  Cardiovascular disease.  High blood pressure (hypertension).  Diabetes.  High cholesterol.  Sickle cell disease.  Blood clotting disorders (hypercoagulable state).  Obesity.  Sleep disorders (obstructive sleep apnea).  Other risk factors include:  Being older than age 60.  Having a history of blood clots, stroke, or mini-stroke (transient ischemic attack, TIA).  Genetic factors, such as race, ethnicity, or a family history of stroke.  Smoking cigarettes or using other tobacco products.  Taking birth control pills, especially if you also use tobacco.  Heavy use of alcohol or drugs, especially cocaine and methamphetamine.  Physical inactivity.  What actions can I take to prevent this?  Manage your health conditions  High cholesterol levels.  Eating a healthy diet is important for preventing high cholesterol. If cholesterol cannot be managed through diet alone, you may need to take medicines.  Take any prescribed medicines to control your cholesterol as told by your health care provider.  Hypertension.  To reduce your risk of stroke, try to keep your blood  pressure below 130/80.  Eating a healthy diet and exercising regularly are important for controlling blood pressure. If these steps are not enough to manage your blood pressure, you may need to take medicines.  Take any prescribed medicines to control hypertension as told by your health care provider.  Ask your health care provider if you should monitor your blood pressure at home.  Have your blood pressure checked every year, even if your blood pressure is normal. Blood pressure increases with age and some medical conditions.  Diabetes.  Eating a healthy diet and exercising regularly are important parts of managing your blood sugar (glucose). If your blood sugar cannot be managed through diet and exercise, you may need to take medicines.  Take any prescribed medicines to control your diabetes as told by your health care provider.  Get evaluated for obstructive sleep apnea. Talk to your health care provider about getting a sleep evaluation if you snore a lot or have excessive sleepiness.  Make sure that any other medical conditions you have, such as atrial fibrillation or atherosclerosis, are managed.  Nutrition  Follow instructions from your health care provider about what to eat or drink to help manage your health condition. These instructions may include:  Reducing your daily calorie intake.  Limiting how much salt (sodium) you use to 1,500 milligrams (mg) each day.  Using only healthy fats for cooking, such as olive oil, canola oil, or sunflower oil.  Eating healthy foods. You can do this by:  Choosing foods that are high in fiber, such as whole grains, and fresh fruits and vegetables.  Eating at least 5 servings of fruits and vegetables a day. Try to fill one-half of your plate with fruits and vegetables at each meal.  Choosing lean protein foods, such as lean cuts of meat, poultry without skin, fish, tofu, beans, and nuts.  Eating low-fat dairy products.  Avoiding foods that are high in sodium. This can help  "lower blood pressure.  Avoiding foods that have saturated fat, trans fat, and cholesterol. This can help prevent high cholesterol.  Avoiding processed and prepared foods.  Counting your daily carbohydrate intake.    Lifestyle  If you drink alcohol:  Limit how much you have to:  0-1 drink a day for women who are not pregnant.  0-2 drinks a day for men.  Know how much alcohol is in your drink. In the [place] one drink equals one 12 oz bottle of beer (355mL), one 5 oz glass of wine (148mL), or one 1½ oz glass of hard liquor (44mL).  Do not use any products that contain nicotine or tobacco. These products include cigarettes, chewing tobacco, and vaping devices, such as e-cigarettes. If you need help quitting, ask your health care provider.  Avoid secondhand smoke.  Do not use drugs.  Activity    Try to stay at a healthy weight.  Get at least 30 minutes of exercise on most days, such as:  Fast walking.  Biking.  Swimming.  Medicines  Take over-the-counter and prescription medicines only as told by your health care provider. Aspirin or blood thinners (antiplatelets or anticoagulants) may be recommended to reduce your risk of forming blood clots that can lead to stroke.  Avoid taking birth control pills. Talk to your health care provider about the risks of taking birth control pills if:  You are over 35 years old.  You smoke.  You get very bad headaches.  You have had a blood clot.  Where to find more information  American Stroke Association: www.strokeassociation.org  Get help right away if:  You or a loved one has any symptoms of a stroke. \"BE FAST\" is an easy way to remember the main warning signs of a stroke:  B - Balance. Signs are dizziness, sudden trouble walking, or loss of balance.  E - Eyes. Signs are trouble seeing or a sudden change in vision.  F - Face. Signs are sudden weakness or numbness of the face, or the face or eyelid drooping on one side.  A - Arms. Signs are weakness or numbness in an arm. This happens " "suddenly and usually on one side of the body.  S - Speech. Signs are sudden trouble speaking, slurred speech, or trouble understanding what people say.  T - Time. Time to call emergency services. Write down what time symptoms started.  You or a loved one has other signs of a stroke, such as:  A sudden, severe headache with no known cause.  Nausea or vomiting.  Seizure.  These symptoms may represent a serious problem that is an emergency. Do not wait to see if the symptoms will go away. Get medical help right away. Call your local emergency services (911 in the [place] Do not drive yourself to the hospital.  Summary  You can help to prevent a stroke by eating healthy, exercising, not smoking, limiting alcohol intake, and managing any medical conditions you may have.  Do not use any products that contain nicotine or tobacco. These include cigarettes, chewing tobacco, and vaping devices, such as e-cigarettes. If you need help quitting, ask your health care provider.  Remember \"BE FAST\" for warning signs of a stroke. Get help right away if you or a loved one has any of these signs.  This information is not intended to replace advice given to you by your health care provider. Make sure you discuss any questions you have with your health care provider.  Document Revised: 07/19/2021 Document Reviewed: 07/19/2021  Elsevier Patient Education © 2022 [employer]    "

## 2022-10-07 NOTE — ASSESSMENT & PLAN NOTE
Left cerebellar lacunar infarct noted on CT head.  Will order MRI brain and CTA head.neck for further evaluation.  Continue aspirin/atorvastatin for secondary stroke risk reduction.

## 2022-10-07 NOTE — ASSESSMENT & PLAN NOTE
Will order MRI brain for further evaluation of memory loss. If results inconclusive, will order formal neurocognitive evaluation.

## 2022-10-31 ENCOUNTER — HOSPITAL ENCOUNTER (OUTPATIENT)
Dept: CT IMAGING | Facility: HOSPITAL | Age: 67
Discharge: HOME | End: 2022-10-31

## 2022-10-31 ENCOUNTER — HOSPITAL ENCOUNTER (OUTPATIENT)
Dept: MRI IMAGING | Facility: HOSPITAL | Age: 67
Discharge: HOME | End: 2022-10-31

## 2022-10-31 DIAGNOSIS — I63.9 CEREBELLAR INFARCT: ICD-10-CM

## 2022-10-31 DIAGNOSIS — R41.3 MEMORY DIFFICULTY: ICD-10-CM

## 2022-10-31 LAB
CREAT BLDA-MCNC: 0.9 MG/DL
EGFRCR SERPLBLD CKD-EPI 2021: 94.8 ML/MIN/1.73

## 2022-10-31 PROCEDURE — 0 IOPAMIDOL PER 1 ML: Performed by: NURSE PRACTITIONER

## 2022-10-31 PROCEDURE — 0 GADOBENATE DIMEGLUMINE 529 MG/ML SOLUTION: Performed by: NURSE PRACTITIONER

## 2022-10-31 PROCEDURE — 70496 CT ANGIOGRAPHY HEAD: CPT

## 2022-10-31 PROCEDURE — 70553 MRI BRAIN STEM W/O & W/DYE: CPT

## 2022-10-31 PROCEDURE — A9577 INJ MULTIHANCE: HCPCS | Performed by: NURSE PRACTITIONER

## 2022-10-31 PROCEDURE — 70498 CT ANGIOGRAPHY NECK: CPT

## 2022-10-31 PROCEDURE — 82565 ASSAY OF CREATININE: CPT

## 2022-10-31 RX ADMIN — GADOBENATE DIMEGLUMINE 18 ML: 529 INJECTION, SOLUTION INTRAVENOUS at 15:54

## 2022-10-31 RX ADMIN — IOPAMIDOL 100 ML: 755 INJECTION, SOLUTION INTRAVENOUS at 15:06

## 2022-12-14 DIAGNOSIS — I65.23 CAROTID ARTERY PLAQUE, BILATERAL: ICD-10-CM

## 2022-12-14 DIAGNOSIS — E78.2 MIXED HYPERLIPIDEMIA: ICD-10-CM

## 2022-12-14 RX ORDER — ATORVASTATIN CALCIUM 10 MG/1
10 TABLET, FILM COATED ORAL NIGHTLY
Qty: 90 TABLET | Refills: 0 | OUTPATIENT
Start: 2022-12-14

## 2022-12-15 ENCOUNTER — OFFICE VISIT (OUTPATIENT)
Dept: FAMILY MEDICINE CLINIC | Facility: CLINIC | Age: 67
End: 2022-12-15

## 2022-12-15 VITALS
TEMPERATURE: 97.1 F | BODY MASS INDEX: 29.35 KG/M2 | SYSTOLIC BLOOD PRESSURE: 124 MMHG | HEIGHT: 67 IN | HEART RATE: 74 BPM | OXYGEN SATURATION: 96 % | WEIGHT: 187 LBS | DIASTOLIC BLOOD PRESSURE: 68 MMHG

## 2022-12-15 DIAGNOSIS — I65.23 CAROTID ARTERY PLAQUE, BILATERAL: ICD-10-CM

## 2022-12-15 DIAGNOSIS — Z00.00 MEDICARE ANNUAL WELLNESS VISIT, SUBSEQUENT: Primary | ICD-10-CM

## 2022-12-15 DIAGNOSIS — E78.2 MIXED HYPERLIPIDEMIA: ICD-10-CM

## 2022-12-15 PROCEDURE — 1160F RVW MEDS BY RX/DR IN RCRD: CPT | Performed by: NURSE PRACTITIONER

## 2022-12-15 PROCEDURE — G0402 INITIAL PREVENTIVE EXAM: HCPCS | Performed by: NURSE PRACTITIONER

## 2022-12-15 PROCEDURE — 1170F FXNL STATUS ASSESSED: CPT | Performed by: NURSE PRACTITIONER

## 2022-12-15 RX ORDER — ATORVASTATIN CALCIUM 10 MG/1
10 TABLET, FILM COATED ORAL NIGHTLY
Qty: 90 TABLET | Refills: 1 | Status: SHIPPED | OUTPATIENT
Start: 2022-12-15

## 2022-12-16 ENCOUNTER — CLINICAL SUPPORT (OUTPATIENT)
Dept: FAMILY MEDICINE CLINIC | Facility: CLINIC | Age: 67
End: 2022-12-16

## 2022-12-16 DIAGNOSIS — I65.23 CAROTID ARTERY PLAQUE, BILATERAL: ICD-10-CM

## 2022-12-16 DIAGNOSIS — E78.2 MIXED HYPERLIPIDEMIA: ICD-10-CM

## 2022-12-16 LAB
ALBUMIN SERPL-MCNC: 4 G/DL (ref 3.5–5.2)
ALBUMIN/GLOB SERPL: 1.8 G/DL
ALP SERPL-CCNC: 98 U/L (ref 39–117)
ALT SERPL W P-5'-P-CCNC: 22 U/L (ref 1–41)
ANION GAP SERPL CALCULATED.3IONS-SCNC: 6.8 MMOL/L (ref 5–15)
AST SERPL-CCNC: 18 U/L (ref 1–40)
BILIRUB SERPL-MCNC: 0.3 MG/DL (ref 0–1.2)
BUN SERPL-MCNC: 10 MG/DL (ref 8–23)
BUN/CREAT SERPL: 11.5 (ref 7–25)
CALCIUM SPEC-SCNC: 9.4 MG/DL (ref 8.6–10.5)
CHLORIDE SERPL-SCNC: 107 MMOL/L (ref 98–107)
CHOLEST SERPL-MCNC: 132 MG/DL (ref 0–200)
CO2 SERPL-SCNC: 26.2 MMOL/L (ref 22–29)
CREAT SERPL-MCNC: 0.87 MG/DL (ref 0.76–1.27)
EGFRCR SERPLBLD CKD-EPI 2021: 95.8 ML/MIN/1.73
GLOBULIN UR ELPH-MCNC: 2.2 GM/DL
GLUCOSE SERPL-MCNC: 76 MG/DL (ref 65–99)
HDLC SERPL-MCNC: 51 MG/DL (ref 40–60)
LDLC SERPL CALC-MCNC: 69 MG/DL (ref 0–100)
LDLC/HDLC SERPL: 1.38 {RATIO}
POTASSIUM SERPL-SCNC: 4.7 MMOL/L (ref 3.5–5.2)
PROT SERPL-MCNC: 6.2 G/DL (ref 6–8.5)
SODIUM SERPL-SCNC: 140 MMOL/L (ref 136–145)
TRIGL SERPL-MCNC: 54 MG/DL (ref 0–150)
VLDLC SERPL-MCNC: 12 MG/DL (ref 5–40)

## 2022-12-16 PROCEDURE — 80053 COMPREHEN METABOLIC PANEL: CPT | Performed by: NURSE PRACTITIONER

## 2022-12-16 PROCEDURE — 80061 LIPID PANEL: CPT | Performed by: NURSE PRACTITIONER

## 2022-12-16 PROCEDURE — 36415 COLL VENOUS BLD VENIPUNCTURE: CPT | Performed by: NURSE PRACTITIONER

## 2022-12-16 NOTE — PROGRESS NOTES
Venipuncture Blood Specimen Collection  Venipuncture performed in left arm by Alissa Avalos with good hemostasis. Patient tolerated the procedure well without complications.   12/16/22   Alissa Avalos

## 2022-12-19 NOTE — PROGRESS NOTES
Please mail letter to patient stating    Mr. Marroquin, comprehensive panel shows normal glucose and normal kidney and liver function tests and normal electrolytes and your cholesterol panel was perfect

## 2022-12-20 ENCOUNTER — OFFICE VISIT (OUTPATIENT)
Dept: NEUROLOGY | Facility: CLINIC | Age: 67
End: 2022-12-20

## 2022-12-20 VITALS
HEIGHT: 67 IN | SYSTOLIC BLOOD PRESSURE: 147 MMHG | DIASTOLIC BLOOD PRESSURE: 72 MMHG | BODY MASS INDEX: 30.13 KG/M2 | WEIGHT: 192 LBS | HEART RATE: 68 BPM

## 2022-12-20 DIAGNOSIS — R41.3 MEMORY DIFFICULTY: Primary | ICD-10-CM

## 2022-12-20 PROCEDURE — 99214 OFFICE O/P EST MOD 30 MIN: CPT | Performed by: NURSE PRACTITIONER

## 2022-12-20 NOTE — PROGRESS NOTES
"Chief Complaint  Neurologic Problem    Subjective          Toy Marroquin presents to [facility]  History of Present Illness  Following up for memory.  States he feels he's continuing to have significant short term memory loss. Endorses getting confused about the day and time.  also agrees that he's experiencing cognitive decline.       Interval History:   He is being evaluated today for concerns regarding memory.  He feels that memory loss began 3 years ago.  Family Has noticed any memory difficulty.  Is having difficulty with short term memory.  Endorses difficulty handling financial affairs, such as balancing checkbook and paying bills timely.  Does Not feel as if he could learn a new tool, appliance or gadget.  Denies decreased interest in hobbies or activities.  Endorses depression.  Denies homicidal ideation and suicidal ideation.  Does experience repetitive questioning. Endorses difficulty with judgment and impulsivity    Does not live alone.  Does drive.  Endorses getting lost while driving    Endorses family history of Alzheimer's Disease, brother     States that his daughter passed away 5 years ago.  Significant other states that he gets confused about the day and sometimes the time.       Objective   Vital Signs:   /72   Pulse 68   Ht 170.2 cm (67\")   Wt 87.1 kg (192 lb)   BMI 30.07 kg/m²     Physical Exam  HENT:      Head: Normocephalic.   Pulmonary:      Effort: Pulmonary effort is normal.   Neurological:      Mental Status: He is alert and oriented to person, place, and time.      Cranial Nerves: Cranial nerves are intact.      Sensory: Sensation is intact.      Motor: Motor function is intact.      Coordination: Coordination is intact.      Deep Tendon Reflexes: Reflexes are normal and symmetric.        Neurologic Exam     Mental Status   Oriented to person, place, and time.        Result Review :   CMP:  Lab Results   Component Value Date "    BUN 10 12/16/2022    CREATININE 0.87 12/16/2022     12/16/2022    K 4.7 12/16/2022     12/16/2022    CALCIUM 9.4 12/16/2022    ALBUMIN 4.00 12/16/2022    BILITOT 0.3 12/16/2022    ALKPHOS 98 12/16/2022    AST 18 12/16/2022    ALT 22 12/16/2022     LIPID PANEL:  Lab Results   Component Value Date    TRIG 54 12/16/2022    HDL 51 12/16/2022    VLDL 12 12/16/2022    LDL 69 12/16/2022    LDLHDL 1.38 12/16/2022            MoCA: 28/30    MRI Brain:   The ventricles are normal in size, position, and configuration.  Sulci are not abnormally   prominent.     Scattered tiny foci of nonspecific T2 bright signal in cerebral white matter are consistent with   mild small vessel disease and/or scattered lacunar infarcts.     No abnormal bright signal is seen on diffusion weighted images.  No restricted diffusion is   evident.     No abnormal dark signal is seen on magnetic susceptibility sequence sensitive for blood products.     No abnormal patterns of enhancement are seen.     No abnormality of the pituitary or orbits is evident.     The paranasal sinuses are well aerated.  No abnormal mastoid signal is seen.      CTA head:   Major arterial vasculature within head appears widely patent, with no evidence of thrombus or aneurysm.    CTA Neck:    Major arterial vasculature within neck appears widely patent, with no hemodynamically significant stenosis or dissection     Assessment and Plan    Diagnoses and all orders for this visit:    1. Memory difficulty (Primary)  Assessment & Plan:  MRI brain shows age appropriate white matter disease and is otherwise unrevealing.  CTA head/neck shows no significant vascular stenosis or occlusive disease.  MoCA was intact at 28/30.  Cognitive labs WNL.  Due to inconsistency between reported memory difficulty and test results, will order neurocognitive testing.     Orders:  -     Ambulatory Referral to Neuropsychology      Follow Up   Return in about 4 months (around 4/20/2023) for  memory f/u.  Patient was given instructions and counseling regarding his condition or for health maintenance advice. Please see specific information pulled into the AVS if appropriate.

## 2022-12-21 PROBLEM — I63.81 LACUNAR INFARCTION: Status: RESOLVED | Noted: 2022-07-15 | Resolved: 2022-12-21

## 2022-12-21 NOTE — ASSESSMENT & PLAN NOTE
MRI brain shows age appropriate white matter disease and is otherwise unrevealing.  CTA head/neck shows no significant vascular stenosis or occlusive disease.  MoCA was intact at 28/30.  Cognitive labs WNL.  Due to inconsistency between reported memory difficulty and test results, will order neurocognitive testing.

## 2023-03-14 ENCOUNTER — OFFICE VISIT (OUTPATIENT)
Dept: FAMILY MEDICINE CLINIC | Facility: CLINIC | Age: 68
End: 2023-03-14
Payer: MEDICARE

## 2023-03-14 VITALS
HEIGHT: 67 IN | DIASTOLIC BLOOD PRESSURE: 80 MMHG | WEIGHT: 185 LBS | TEMPERATURE: 97.4 F | HEART RATE: 84 BPM | BODY MASS INDEX: 29.03 KG/M2 | OXYGEN SATURATION: 97 % | SYSTOLIC BLOOD PRESSURE: 136 MMHG

## 2023-03-14 DIAGNOSIS — R41.3 MEMORY DIFFICULTY: Primary | ICD-10-CM

## 2023-03-14 DIAGNOSIS — M50.30 DDD (DEGENERATIVE DISC DISEASE), CERVICAL: ICD-10-CM

## 2023-03-14 DIAGNOSIS — M54.2 NECK PAIN OF OVER 3 MONTHS DURATION: ICD-10-CM

## 2023-03-14 PROCEDURE — 99213 OFFICE O/P EST LOW 20 MIN: CPT | Performed by: NURSE PRACTITIONER

## 2023-03-14 NOTE — PROGRESS NOTES
Please mail letter to patient stating    Toy the x-rays of the cervical spine do show that at the C5-C6 level there is moderate degenerative disc disease and there is on the left side of that disc area some spurring--everything else shows well-preserved disc spaces and good normal alignment overall--should anything worsen or new symptoms start please follow-up in the office with me so we can further investigate and evaluate

## 2023-03-14 NOTE — PROGRESS NOTES
Chief Complaint  Hyperlipidemia (Patient is here for 3 month follow up. )    Subjective            Toy Marroquin presents to [facility]  History of Present Illness  Pt is here for the F/U with regards to seeing neurology Alysha Luther NP--and then the pt also was referred to the neuropsychologist [facility] and they did the cognitive testing--and then this test lasted 2 hrs    And overall pt reports doing well    And had not yet re-started the prevagen yet--but reports felt brighter when on this--and will restart    Then pt reports having more neck issues and with ROM of the neck and the normally sleeps on his stomach and can't any longer --been ongoing for > 3-4 months--and then pt even been doing routine chiropractic exercises and stretches daily and still not improved or resolved--occaasional use of advil          PHQ-2 Total Score: 0  PHQ-9 Total Score: 0    Past Medical History:   Diagnosis Date   • History of meningitis    • HL (hearing loss)        No Known Allergies     Past Surgical History:   Procedure Laterality Date   • INNER EAR SURGERY Right         Social History     Tobacco Use   • Smoking status: Never   • Smokeless tobacco: Never   Vaping Use   • Vaping Use: Never used   Substance Use Topics   • Alcohol use: Yes     Comment: occ   • Drug use: Yes     Types: Marijuana       Family History   Problem Relation Age of Onset   • Dementia Mother    • Cancer Mother    • Stroke Father    • Heart disease Father    • Dementia Brother    • Early death Daughter    • Cancer Daughter         There are no preventive care reminders to display for this patient.     Current Outpatient Medications on File Prior to Visit   Medication Sig   • Apoaequorin (PREVAGEN EXTRA STRENGTH PO) Take  by mouth.   • aspirin 81 MG EC tablet Take 1 tablet by mouth Daily.   • atorvastatin (Lipitor) 10 MG tablet Take 1 tablet by mouth Every Night.     No current facility-administered  "medications on file prior to visit.       Immunization History   Administered Date(s) Administered   • COVID-19 (MODERNA) 1st, 2nd, 3rd Dose Only 03/25/2021, 04/22/2021, 12/12/2021   • COVID-19 (MODERNA) BIVALENT BOOSTER 12+YRS 11/17/2022       Review of Systems   Constitutional: Positive for fatigue.        Been active--   HENT: Negative for congestion, ear pain, sinus pressure and tinnitus.         Like water trickling in his ears --a sound he reports    Respiratory: Negative for shortness of breath.    Cardiovascular: Negative for chest pain.   Gastrointestinal: Negative for abdominal pain, blood in stool, GERD and indigestion.   Musculoskeletal: Positive for arthralgias, neck pain and neck stiffness.        Pops and crunches --per pt report    Neurological: Negative for dizziness, syncope, light-headedness and numbness.   Psychiatric/Behavioral: Negative for self-injury, suicidal ideas and depressed mood.        Mildly at times a little depressed or anxious         Objective     /80 (BP Location: Left arm, Patient Position: Sitting, Cuff Size: Adult)   Pulse 84   Temp 97.4 °F (36.3 °C) (Temporal)   Ht 170.2 cm (67\")   Wt 83.9 kg (185 lb)   SpO2 97%   BMI 28.98 kg/m²       Physical Exam  Vitals and nursing note reviewed.   Constitutional:       Appearance: Normal appearance.   HENT:      Head: Normocephalic.      Right Ear: Tympanic membrane, ear canal and external ear normal.      Left Ear: Tympanic membrane, ear canal and external ear normal.      Nose: Nose normal.      Mouth/Throat:      Comments: Wearing mask  Eyes:      Pupils: Pupils are equal, round, and reactive to light.   Cardiovascular:      Rate and Rhythm: Normal rate and regular rhythm.      Heart sounds: Normal heart sounds.   Pulmonary:      Effort: Pulmonary effort is normal.      Breath sounds: Normal breath sounds.   Abdominal:      General: Bowel sounds are normal.      Palpations: Abdomen is soft.   Musculoskeletal:      Cervical " back: Normal range of motion and neck supple. Spasms, tenderness and crepitus present. No rigidity, torticollis or bony tenderness. Pain with movement present. Normal range of motion.      Comments: Tense tight muscles to the bilat sides of the neck    Skin:     General: Skin is warm and dry.   Neurological:      Mental Status: He is alert and oriented to person, place, and time.   Psychiatric:         Mood and Affect: Mood normal.         Behavior: Behavior normal.         Thought Content: Thought content normal.         Judgment: Judgment normal.         Result Review :           Office Visit with Alysha Luther APRN (12/20/2022)  NEUROPSYCHOLOGICAL EVAL - SCAN - [facility] testing (03/02/2023)  MRI Brain With & Without Contrast (10/31/2022 15:54)  CT Angiogram Head (10/31/2022 15:07)  CT Angiogram Neck (10/31/2022 15:05)  Duplex Carotid Ultrasound CAR (08/30/2022 13:34)  Office Visit with Kvng Cummins MD (07/15/2022)      XR Spine Cervical Complete 4 or 5 View (In Office) (03/14/2023 11:04)             Assessment and Plan      Diagnoses and all orders for this visit:    1. Memory difficulty (Primary)  Comments:  overall improved/stable     2. Neck pain of over 3 months duration  -     XR Spine Cervical Complete 4 or 5 View (In Office)    3. DDD (degenerative disc disease), cervical  Comments:  Continue chiropractic exercises and stretches as he is doing and may take Tylenol or ibuprofen over-the-counter for pain and follow-up should anything worsen    Went over all the findings of the neuropsychologist from [facility]--patient felt very encouraged and will be seeing and following up with neurology ADRIENNE Mcduffie next month    Overall reports he is staying active has dropped a little bit of weight and will be starting the Prevagen as recommended--and reports overall memory difficulty has improved--denies any anxiety or depression today    With regards to the degenerative disc disease patient will  continue his chiropractic exercises and stretches that he is already doing regularly and may take as needed use of Tylenol or ibuprofen for any pain or discomfort and then follow-up should anything worsen or new symptoms develop            Follow Up     Return in about 3 months (around 6/14/2023), or if symptoms worsen or fail to improve, for Recheck, fasting labs and refills.

## 2023-04-20 ENCOUNTER — OFFICE VISIT (OUTPATIENT)
Dept: NEUROLOGY | Facility: CLINIC | Age: 68
End: 2023-04-20
Payer: MEDICARE

## 2023-04-20 VITALS
DIASTOLIC BLOOD PRESSURE: 67 MMHG | WEIGHT: 183.1 LBS | BODY MASS INDEX: 28.74 KG/M2 | SYSTOLIC BLOOD PRESSURE: 130 MMHG | HEIGHT: 67 IN | HEART RATE: 66 BPM

## 2023-04-20 DIAGNOSIS — F41.9 ANXIETY AND DEPRESSION: ICD-10-CM

## 2023-04-20 DIAGNOSIS — F12.10 MARIJUANA ABUSE: ICD-10-CM

## 2023-04-20 DIAGNOSIS — F32.A ANXIETY AND DEPRESSION: ICD-10-CM

## 2023-04-20 DIAGNOSIS — R41.3 MEMORY DIFFICULTY: Primary | ICD-10-CM

## 2023-04-20 NOTE — PROGRESS NOTES
"Chief Complaint  Neurologic Problem    Subjective          Toy Marroquin presents to [facility]  History of Present Illness  Following up for memory and neurocognitive testing.  Feels as if he's had some mild improvement in memory since previous visit.       Objective   Vital Signs:   /67   Pulse 66   Ht 170.2 cm (67\")   Wt 83.1 kg (183 lb 1.6 oz)   BMI 28.68 kg/m²     Physical Exam  HENT:      Head: Normocephalic.   Pulmonary:      Effort: Pulmonary effort is normal.   Neurological:      Mental Status: He is alert and oriented to person, place, and time.      Sensory: Sensation is intact.      Motor: Motor function is intact.      Coordination: Coordination is intact.      Deep Tendon Reflexes: Reflexes are normal and symmetric.        Neurologic Exam     Mental Status   Oriented to person, place, and time.        Result Review :             Neurocognitive Testing:   -Not consistent with Alzheimer Disease  -Subcortical pattern of decline  -Mild neurocognitive disorder  Recommendations:  -Psychotherapy for mental health treatment  -Speech therapy for compensatory strategies  -Limit marijuana   - Heart/Brain healthy lifestyle     Assessment and Plan    Diagnoses and all orders for this visit:    1. Memory difficulty (Primary)  Assessment & Plan:  Will refer to psychiatry for management of anxiety and depression.  Recommended marijuana cessation. Will continue to monitor for progression of memory loss.  Continue aspirin and atrovastatin for vascular risk reduction.        2. Anxiety and depression  -     Ambulatory Referral to Psychiatry    3. Marijuana abuse    I spent 42 minutes caring for Toy on this date of service. This time includes time spent by me in the following activities:preparing for the visit, reviewing tests, obtaining and/or reviewing a separately obtained history, performing a medically appropriate examination and/or evaluation , counseling and " educating the patient/family/caregiver, ordering medications, tests, or procedures, documenting information in the medical record and independently interpreting results and communicating that information with the patient/family/caregiver  Follow Up   Return in about 6 months (around 10/20/2023) for memory f/u.  Patient was given instructions and counseling regarding his condition or for health maintenance advice. Please see specific information pulled into the AVS if appropriate.

## 2023-04-28 NOTE — ASSESSMENT & PLAN NOTE
Will refer to psychiatry for management of anxiety and depression.  Recommended marijuana cessation. Will continue to monitor for progression of memory loss.  Continue aspirin and atrovastatin for vascular risk reduction.

## 2023-06-15 ENCOUNTER — OFFICE VISIT (OUTPATIENT)
Dept: PSYCHIATRY | Facility: CLINIC | Age: 68
End: 2023-06-15
Payer: MEDICARE

## 2023-06-15 VITALS
SYSTOLIC BLOOD PRESSURE: 131 MMHG | HEART RATE: 74 BPM | WEIGHT: 189 LBS | DIASTOLIC BLOOD PRESSURE: 75 MMHG | BODY MASS INDEX: 29.66 KG/M2 | HEIGHT: 67 IN

## 2023-06-15 DIAGNOSIS — F33.1 MAJOR DEPRESSIVE DISORDER, RECURRENT EPISODE, MODERATE: Primary | ICD-10-CM

## 2023-06-15 DIAGNOSIS — F41.1 GENERALIZED ANXIETY DISORDER: ICD-10-CM

## 2023-06-15 NOTE — PROGRESS NOTES
"Subjective   Toy Marroquin is a 68 y.o. male who presents today for initial evaluation   This provider is located at [address] [place] [address], [address] in [place], [place]. In-person visit consisting of the patient and I only. The patient is accepting of and agreeable to this appointment.       Chief Complaint:  Depression, anxiety    History of Present Illness: Depression: Onset approximately 20 years ago (no specific triggers), increased approximately 5 years ago with the passing of his daughter. Increased past few years (\"world today\")  Depressed mood: endorses   Markedly diminished interest or pleasure: n  Significant weight loss when not dieting or weight gain, or decrease or increase in appetite: endorses increased appetite. Gained 4lbs in the past two months.   Insomnia or hypersomnia: endorses insomnia  Psychomotor agitation or retardation: n  Fatigue or loss of energy: endorses   Feelings of worthlessness or excessive or inappropriate guilt: endorses   Diminished ability to think or concentrate, or indecisiveness: endorses  Recurrent thoughts of death, recurrent suicidal ideation without a specific plan, or suicide attempt or specific plan for committing suicide- denies    Anxiety: Onset approximately 20 years ago, increased approximately 5 years ago (see above)  Anxious, nervous or worried on most days about a number of events or activities- endorses  No control over worries- endorses- working on positive coping skills  Irritability- endorses   Fatigue: see above  Difficulty concentrating- see above  Sleep disturbance- see above  Restlessness- denies  Tension in muscles- denies    Psychiatric Review of Systems: Patient denies any current or previous hallucinations/delusions, paranoia, manic symptoms or PTSD.     PHQ-9 Depression Screening  PHQ-9 Total Score:      Little interest or pleasure in doing things?     Feeling down, depressed, or hopeless?     Trouble falling or staying asleep, or sleeping too " much?     Feeling tired or having little energy?     Poor appetite or overeating?     Feeling bad about yourself - or that you are a failure or have let yourself or your family down?     Trouble concentrating on things, such as reading the newspaper or watching television?     Moving or speaking so slowly that other people could have noticed? Or the opposite - being so fidgety or restless that you have been moving around a lot more than usual?     Thoughts that you would be better off dead, or of hurting yourself in some way?     PHQ-9 Total Score       DONA-7  Feeling nervous, anxious or on edge: Several days  Not being able to stop or control worrying: Several days  Worrying too much about different things: Several days  Trouble Relaxing: Several days  Being so restless that it is hard to sit still: Not at all  Feeling afraid as if something awful might happen: Several days  Becoming easily annoyed or irritable: Several days  DONA 7 Total Score: 6  If you checked any problems, how difficult have these problems made it for you to do your work, take care of things at home, or get along with other people: Somewhat difficult      Past Surgical History:  Past Surgical History:   Procedure Laterality Date    INNER EAR SURGERY Right        Problem List:  Patient Active Problem List   Diagnosis    SOB (shortness of breath)    Palpitations    History of meningitis    Personal history of hearing loss    Carotid artery plaque, bilateral    Mixed hyperlipidemia    Memory difficulty       Allergy:   No Known Allergies     Discontinued Medications:  There are no discontinued medications.    Current Medications:   Current Outpatient Medications   Medication Sig Dispense Refill    aspirin 81 MG EC tablet Take 1 tablet by mouth Daily.      atorvastatin (Lipitor) 10 MG tablet Take 1 tablet by mouth Every Night. 90 tablet 1    Apoaequorin (PREVAGEN EXTRA STRENGTH PO) Take  by mouth. (Patient not taking: Reported on 6/15/2023)       No  "current facility-administered medications for this visit.       Past Medical History:  Past Medical History:   Diagnosis Date    History of meningitis     HL (hearing loss)        Past Psychiatric History:  Began Treatment: 2023  Diagnoses: Depression, anxiety  Psychiatrist: Esequiel  Therapist: Esequiel  Admission History: Denies  Medication Trials: Denies  Self Harm: Denies  Suicide Attempts: Denies    Substance Abuse History:   Types: Denies  Withdrawal Symptoms: Not applicable  Longest Period Sober: Not applicable  AA: Not applicable    Social History:  Martial Status:   Employed: Artist  Kids: Four children  House: Self   History: Denies    Social History     Socioeconomic History    Marital status: Single   Tobacco Use    Smoking status: Never    Smokeless tobacco: Never   Vaping Use    Vaping Use: Never used   Substance and Sexual Activity    Alcohol use: Yes     Comment: occ    Drug use: Yes     Types: Marijuana    Sexual activity: Defer       Family History:   Suicide Attempts: Denies  Suicide Completions: Denies      Family History   Problem Relation Age of Onset    Dementia Mother     Cancer Mother     Stroke Father     Heart disease Father     Dementia Brother     Early death Daughter     Cancer Daughter        Developmental History:   Born[place]  Siblings: Multiple  Childhood: Denies  High School: Graduate  College: Graduate    Access to Firearms: Denies    Mental Status Exam:     Appearance: good eye contact, normal street clothes, groomed, sitting in chair   Behavior: pleasant and cooperative  Motor: no abnormal  Speech: normal rhythm, rate, volume, tone, not hyperverbal, not pressured, normal prosidy  Mood: \" Okay \"  Affect: euthymic  Thought Content: negative suicidal ideations, negative homicidal ideations, negative obsessions  Perceptions: negative auditory hallucinations, negative visual hallucinations, negative delusions, negative paranoia  Thought Process: goal directed, " "linear  Insight/Judgement: fair/fair  Cognition: grossly intact  Attention: intact  Orientation: person, place, time and situation  Memory: intact    Review of Systems:     Constitutional: Denies fatigue, night sweats  Eyes: Denies double vision, blurred vision  HENT: Denies vertigo, recent head injury  Cardiovascular: Denies chest pain, irregular heartbeats  Respiratory: Denies productive cough, shortness of breath  Gastrointestinal: Denies nausea, vomiting  Genitourinary: Denies dysuria, urinary retention  Integument: Denies hair growth change, new skin lesions  Neurologic: Denies altered mental status, seizures  Musculoskeletal: Denies joint swelling, limitation of motion  Endocrine: Denies cold intolerance, heat intolerance  Psychiatric: Admits anxiety, depression. Denies olivia, post-traumatic stress disorder, obsessive compulsive disorder, psychosis.   Allergic-immunologic: Denies frequent illnesses      Vital Signs:   /75   Pulse 74   Ht 170.2 cm (67\")   Wt 85.7 kg (189 lb)   BMI 29.60 kg/m²      Lab Results:   Clinical Support on 12/16/2022   Component Date Value Ref Range Status    Glucose 12/16/2022 76  65 - 99 mg/dL Final    BUN 12/16/2022 10  8 - 23 mg/dL Final    Creatinine 12/16/2022 0.87  0.76 - 1.27 mg/dL Final    Sodium 12/16/2022 140  136 - 145 mmol/L Final    Potassium 12/16/2022 4.7  3.5 - 5.2 mmol/L Final    Slight hemolysis detected by analyzer. Results may be affected.    Chloride 12/16/2022 107  98 - 107 mmol/L Final    CO2 12/16/2022 26.2  22.0 - 29.0 mmol/L Final    Calcium 12/16/2022 9.4  8.6 - 10.5 mg/dL Final    Total Protein 12/16/2022 6.2  6.0 - 8.5 g/dL Final    Albumin 12/16/2022 4.00  3.50 - 5.20 g/dL Final    ALT (SGPT) 12/16/2022 22  1 - 41 U/L Final    AST (SGOT) 12/16/2022 18  1 - 40 U/L Final    Alkaline Phosphatase 12/16/2022 98  39 - 117 U/L Final    Total Bilirubin 12/16/2022 0.3  0.0 - 1.2 mg/dL Final    Globulin 12/16/2022 2.2  gm/dL Final    A/G Ratio 12/16/2022 " 1.8  g/dL Final    BUN/Creatinine Ratio 12/16/2022 11.5  7.0 - 25.0 Final    Anion Gap 12/16/2022 6.8  5.0 - 15.0 mmol/L Final    eGFR 12/16/2022 95.8  >60.0 mL/min/1.73 Final    National Kidney Foundation and American Society of Nephrology (ASN) Task Force recommended calculation based on the Chronic Kidney Disease Epidemiology Collaboration (CKD-EPI) equation refit without adjustment for race.    Total Cholesterol 12/16/2022 132  0 - 200 mg/dL Final    Triglycerides 12/16/2022 54  0 - 150 mg/dL Final    HDL Cholesterol 12/16/2022 51  40 - 60 mg/dL Final    LDL Cholesterol  12/16/2022 69  0 - 100 mg/dL Final    VLDL Cholesterol 12/16/2022 12  5 - 40 mg/dL Final    LDL/HDL Ratio 12/16/2022 1.38   Final   Hospital Outpatient Visit on 10/31/2022   Component Date Value Ref Range Status    Creatinine 10/31/2022 0.90  mg/dL Final    Serial Number: [id number]:  [id number]    eGFR 10/31/2022 94.8  >60.0 mL/min/1.73 Final   Ancillary Procedure on 08/30/2022   Component Date Value Ref Range Status    Target HR (85%) 08/30/2022 132  bpm Final    Max. Pred. HR (100%) 08/30/2022 155  bpm Final    Prox CCA PSV 08/30/2022 128  cm/sec Final    Prox CCA EDV 08/30/2022 24  cm/sec Final    Right Mid CCA PSV 08/30/2022 101  cm/sec Final    right Mid CCA EDV 08/30/2022 18  cm/sec Final    Dist CCA PSV 08/30/2022 102  cm/sec Final    Dist CCA EDV 08/30/2022 18  cm/sec Final    Prox ECA PSV 08/30/2022 97  cm/sec Final    Prox ECA EDV 08/30/2022 11  cm/sec Final    Prox ICA PSV 08/30/2022 93  cm/sec Final    Prox ICA EDV 08/30/2022 27  cm/sec Final    Mid ICA PSV 08/30/2022 101  cm/sec Final    Mid ICA EDV 08/30/2022 26  cm/sec Final    Dist ICA PSV 08/30/2022 70  cm/sec Final    Dist ICA EDV 08/30/2022 22  cm/sec Final    Vertebral A PSV 08/30/2022 25  cm/sec Final    Vertebral A EDV 08/30/2022 6  cm/sec Final    Prox CCA PSV 08/30/2022 122  cm/sec Final    Prox CCA EDV 08/30/2022 19  cm/sec Final    left Mid CCA PSV 08/30/2022 108   cm/sec Final    left Mid CCA EDV 08/30/2022 22  cm/sec Final    Dist CCA PSV 08/30/2022 109  cm/sec Final    Dist CCA EDV 08/30/2022 27  cm/sec Final    Prox ECA PSV 08/30/2022 101  cm/sec Final    Prox ECA EDV 08/30/2022 15  cm/sec Final    Prox ICA PSV 08/30/2022 63  cm/sec Final    Prox ICA EDV 08/30/2022 15  cm/sec Final    Mid ICA PSV 08/30/2022 120  cm/sec Final    Mid ICA EDV 08/30/2022 37  cm/sec Final    Dist ICA PSV 08/30/2022 83  cm/sec Final    Dist ICA EDV 08/30/2022 26  cm/sec Final    Vertebral A PSV 08/30/2022 45  cm/sec Final    Vertebral A EDV 08/30/2022 11  cm/sec Final   Ancillary Procedure on 08/30/2022   Component Date Value Ref Range Status    Target HR (85%) 08/30/2022 132  bpm Final    Max. Pred. HR (100%) 08/30/2022 155  bpm Final    Ascending aorta 08/30/2022 3.9  cm Final    IVRT 08/30/2022 63.0  msec Final    LA ESV Index (BP) 08/30/2022 17.0  ml/m2 Final    Avg E/e' ratio 08/30/2022 12.67   Final    Ao root diam 08/30/2022 3.6  cm Final    EF(MOD-bp) 08/30/2022 62.0  % Final    Lat Peak E' Enmanuel 08/30/2022 9.0  cm/sec Final    LVPWd 08/30/2022 0.9  cm Final    Med Peak E' Enmanuel 08/30/2022 6.00  cm/sec Final    MV dec time 08/30/2022 198  msec Final    IVSd 08/30/2022 1.0  cm Final    LA dimension (2D)  08/30/2022 3.7  cm Final    LVIDd 08/30/2022 4.8  cm Final    LVIDs 08/30/2022 2.8  cm Final    MV E/A 08/30/2022 1.5   Final    MV A max enmanuel 08/30/2022 65.0  cm/sec Final    MV E max enmanuel 08/30/2022 95.0  cm/sec Final    TAPSE (>1.6) 08/30/2022 2.30  cm Final   Ancillary Procedure on 07/25/2022   Component Date Value Ref Range Status    Target HR (85%) 07/25/2022 132  bpm Final    Max. Pred. HR (100%) 07/25/2022 155  bpm Final    Total Enrollment Days 07/25/2022 30   Final       EKG Results:  No orders to display       Imaging Results:  XR Spine Cervical Complete 4 or 5 View (In Office)    Result Date: 3/14/2023   Degenerative disc disease at C5-C6     RASHAWN HAMMONDS MD       Electronically  Signed and Approved By: RASHAWN HAMMONDS MD on 3/14/2023 at 11:29                 Assessment & Plan   Diagnoses and all orders for this visit:    1. Major depressive disorder, recurrent episode, moderate (Primary)    2. Generalized anxiety disorder        Presents with history of depression and anxiety. Declines antidepressant medication at this time. Psychotherapy for depression and anxiety. 16 minutes of supportive psychotherapy with goal to strengthen defenses, promote problems solving, restore adaptive functioning and provide symptom relief. The therapeutic alliance was strengthened to encourage the patient to express their thoughts and feelings. Esteem building was enhanced through praise, reassurance, normalizing and encouragement. Coping skills were enhanced to build distress tolerance skills and emotional regulation. Allowed patient to freely discuss issues without interruption or judgement with unconditional positive regard, active listening skills, and empathy. Provided a safe, confidential environment to facilitate the development of a positive therapeutic relationship and encourage open, honest communication. Assisted patient in identifying risk factors which would indicate the need for higher level of care including thoughts to harm self or others and/or self-harming behavior and encouraged patient to contact this office, call 911, or present to the nearest emergency room should any of these events occur. Assisted patient in processing session content; acknowledged and normalized patient's thoughts, feelings, and concerns by utilizing a person-centered approach in efforts to build appropriate rapport and a positive therapeutic relationship with open and honest communication. Patient given education on medication side effects, diagnosis/illness and relapse symptoms. Plan to continue supportive psychotherapy in next appointment to provide symptom relief. 4 weeks    Diagnoses: as above  Symptoms: as  above  Functional status: good  Mental Status Exam: as above     Treatment plan: medication management and supportive psychotherapy  Prognosis: good  Progress: initial visit    Visit Diagnoses:    ICD-10-CM ICD-9-CM   1. Major depressive disorder, recurrent episode, moderate  F33.1 296.32   2. Generalized anxiety disorder  F41.1 300.02       PLAN:  Safety: No acute safety concerns.   Therapy: Declines  Risk Assessment: Risk of self-harm acutely is moderate.  Risk factors include anxiety disorder, mood disorder, and recent psychosocial stressors (pandemic). Protective factors include no family history, denies access to guns/weapons, no present SI, no history of suicide attempts or self-harm in the past, minimal AODA, healthcare seeking, future orientation, willingness to engage in care.  Risk of self-harm chronically is also moderate, but could be further elevated in the event of treatment noncompliance and/or AODA.  Medications: No medication orders at this time  Labs/studies: No labs/studies ordered at this time  Follow-up: 4 weeks    Patient screened positive for depression based on a PHQ-9 score of 8 on 6/15/2023. Follow-up recommendations include: Suicide Risk Assessment performed.         TREATMENT PLAN/GOALS: Continue supportive psychotherapy efforts and medications as indicated. Treatment and medication options discussed during today's visit. Patient ackowledged and verbally consented to continue with current treatment plan and was educated on the importance of compliance with treatment and follow-up appointments.      MEDICATION ISSUES:  WILY reviewed as expected.  Discussed medication options and treatment plan of prescribed medication as well as the risks, benefits, and side effects including potential falls, possible impaired driving and metabolic adversities among others. Patient is agreeable to call the office with any worsening of symptoms or onset of side effects. Patient is agreeable to call 911 or  go to the nearest ER should he/she begin having SI/HI. No medication side effects or related complaints today.     MEDS ORDERED DURING VISIT:  No orders of the defined types were placed in this encounter.      Return in about 4 weeks (around 7/13/2023) for Next scheduled follow up.         This document has been electronically signed by ADRIENNE Dominguez  Susan 15, 2023 10:29 EDT      Part of this note may be an electronic transcription/translation of spoken language to printed text using the Dragon Dictation System.

## 2023-06-15 NOTE — TREATMENT PLAN
Multi-Disciplinary Problems (from Behavioral Health Treatment Plan)      Active Problems       Problem: Anxiety  Start Date: 06/15/23      Problem Details: The patient self-scales this problem as a 5 with 10 being the worst.          Goal Priority Start Date Expected End Date End Date    Patient will develop and implement behavioral and cognitive strategies to reduce anxiety and irrational fears. -- 06/15/23 -- --    Goal Details: Progress toward goal:  Not appropriate to rate progress toward goal since this is the initial treatment plan.          Goal Intervention Frequency Start Date End Date    Help patient explore past emotional issues in relation to present anxiety. Weekly 06/15/23 --    Intervention Details: Duration of treatment until until remission of symptoms.          Goal Intervention Frequency Start Date End Date    Help patient develop an awareness of their cognitive and physical responses to anxiety. Weekly 06/15/23 --    Intervention Details: Duration of treatment until until remission of symptoms.                  Problem: Depression  Start Date: 06/15/23      Problem Details: The patient self-scales this problem as a 5 with 10 being the worst.          Goal Priority Start Date Expected End Date End Date    Patient will demonstrate the ability to initiate new constructive life skills outside of sessions on a consistent basis. -- 06/15/23 -- --    Goal Details: Progress toward goal:  Not appropriate to rate progress toward goal since this is the initial treatment plan.          Goal Intervention Frequency Start Date End Date    Assist patient in setting attainable activities of daily living goals. PRN 06/15/23 --      Goal Intervention Frequency Start Date End Date    Provide education about depression Weekly 06/15/23 --    Intervention Details: Duration of treatment until until remission of symptoms.          Goal Intervention Frequency Start Date End Date    Assist patient in developing healthy  coping strategies. Weekly 06/15/23 --    Intervention Details: Duration of treatment until until remission of symptoms.                          Reviewed By       Florentino Nicole APRN 06/15/23 [id number]                     I have discussed and reviewed this treatment plan with the patient.

## 2023-08-10 ENCOUNTER — TELEPHONE (OUTPATIENT)
Dept: FAMILY MEDICINE CLINIC | Facility: CLINIC | Age: 68
End: 2023-08-10

## 2023-08-10 NOTE — TELEPHONE ENCOUNTER
"Caller: Toy Marroquin    Relationship: Self    Best call back number: [phone]    What medication are you requesting: Prevagen     Have you had these symptoms before:    [] Yes  [x] No    Have you been treated for these symptoms before:   [] Yes  [x] No    If a prescription is needed, what is your preferred pharmacy and phone number: [facility][id number] - [place], [place] - [address] [place] [address] [facility] [phone] [facility] [phone]      Additional notes:  PATIENT WAS ADVISED TO GET OVER THE COUNTER, BUT IT IS VERY EXPENSIVE, AND WOULD LIKE SCRIPT SENT TO PHARMACY SO INSURANCE CAN COVER IT.   "

## 2023-08-10 NOTE — TELEPHONE ENCOUNTER
Prevagen is basically a vitamin supplement and we have checked his labs.  I do not recommend at this time.

## 2023-08-15 ENCOUNTER — TELEMEDICINE (OUTPATIENT)
Dept: PSYCHIATRY | Facility: CLINIC | Age: 68
End: 2023-08-15
Payer: MEDICARE

## 2023-08-15 DIAGNOSIS — F33.1 MAJOR DEPRESSIVE DISORDER, RECURRENT EPISODE, MODERATE: Primary | ICD-10-CM

## 2023-08-15 DIAGNOSIS — F41.1 GENERALIZED ANXIETY DISORDER: ICD-10-CM

## 2023-08-15 NOTE — PROGRESS NOTES
"Subjective   Toy Marroquin is a 68 y.o. male who presents today for follow up.   Mode of visit: Video  Location of provider: Home  Location of patient: Home  Does the patient consent to use a video/audio connection for your medical care today? Yes  The visit included audio and video interaction. No technical issues occurred during this visit.      Chief Complaint:  Depression, anxiety    History of Present Illness:     Depression/mood: has improved  Relationship stress- working on things. She lives in [place].   Taught art class recently, which he enjoyed  Anxiety: has improved  Denies excessive worries   Working on positive coping skills  Sleep disturbance: denies  Low energy: denies  Low motivation/Interest: denies  Appetite change: denies  Medication compliant  Side effects: denies  Refills needed  Denies SI HI AVH    Coping skills: getting out to walk, paint, playing guitar, photography    6/15/23 Depression: Onset approximately 20 years ago (no specific triggers), increased approximately 5 years ago with the passing of his daughter. Increased past few years (\"world today\")  Depressed mood: endorses   Markedly diminished interest or pleasure: n  Significant weight loss when not dieting or weight gain, or decrease or increase in appetite: endorses increased appetite. Gained 4lbs in the past two months.   Insomnia or hypersomnia: endorses insomnia  Psychomotor agitation or retardation: n  Fatigue or loss of energy: endorses   Feelings of worthlessness or excessive or inappropriate guilt: endorses   Diminished ability to think or concentrate, or indecisiveness: endorses  Recurrent thoughts of death, recurrent suicidal ideation without a specific plan, or suicide attempt or specific plan for committing suicide- denies    Anxiety: Onset approximately 20 years ago, increased approximately 5 years ago (see above)  Anxious, nervous or worried on most days about a number of events or activities- endorses  No control " over worries- endorses- working on positive coping skills  Irritability- endorses   Fatigue: see above  Difficulty concentrating- see above  Sleep disturbance- see above  Restlessness- denies  Tension in muscles- denies    Psychiatric Review of Systems: Patient denies any current or previous hallucinations/delusions, paranoia, manic symptoms or PTSD.     PHQ-9 Depression Screening  PHQ-9 Total Score:      Little interest or pleasure in doing things?     Feeling down, depressed, or hopeless?     Trouble falling or staying asleep, or sleeping too much?     Feeling tired or having little energy?     Poor appetite or overeating?     Feeling bad about yourself - or that you are a failure or have let yourself or your family down?     Trouble concentrating on things, such as reading the newspaper or watching television?     Moving or speaking so slowly that other people could have noticed? Or the opposite - being so fidgety or restless that you have been moving around a lot more than usual?     Thoughts that you would be better off dead, or of hurting yourself in some way?     PHQ-9 Total Score       DONA-7  Feeling nervous, anxious or on edge: (P) Several days  Not being able to stop or control worrying: (P) Several days  Worrying too much about different things: (P) Several days  Trouble Relaxing: (P) Several days  Being so restless that it is hard to sit still: (P) Several days  Feeling afraid as if something awful might happen: (P) Several days  Becoming easily annoyed or irritable: (P) Several days  DONA 7 Total Score: (P) 7  If you checked any problems, how difficult have these problems made it for you to do your work, take care of things at home, or get along with other people: (P) Somewhat difficult      Past Surgical History:  Past Surgical History:   Procedure Laterality Date    INNER EAR SURGERY Right        Problem List:  Patient Active Problem List   Diagnosis    SOB (shortness of breath)    Palpitations     History of meningitis    Personal history of hearing loss    Carotid artery plaque, bilateral    Mixed hyperlipidemia    Memory difficulty       Allergy:   No Known Allergies     Discontinued Medications:  There are no discontinued medications.      Current Medications:   Current Outpatient Medications   Medication Sig Dispense Refill    aspirin 81 MG EC tablet Take 1 tablet by mouth Daily.      atorvastatin (Lipitor) 10 MG tablet Take 1 tablet by mouth Every Night. 90 tablet 1     No current facility-administered medications for this visit.       Past Medical History:  Past Medical History:   Diagnosis Date    History of meningitis     HL (hearing loss)        Past Psychiatric History:  Began Treatment: 2023  Diagnoses: Depression, anxiety  Psychiatrist: Esequiel  Therapist: Denies  Admission History: Denies  Medication Trials: Denies  Self Harm: Denies  Suicide Attempts: Denies    Substance Abuse History:   Types: Denies  Withdrawal Symptoms: Not applicable  Longest Period Sober: Not applicable  AA: Not applicable    Social History:  Martial Status:   Employed: Artist  Kids: Four children  House: Self   History: Denies    Social History     Socioeconomic History    Marital status: Single   Tobacco Use    Smoking status: Never    Smokeless tobacco: Never   Vaping Use    Vaping Use: Never used   Substance and Sexual Activity    Alcohol use: Yes     Comment: occ    Drug use: Yes     Types: Marijuana    Sexual activity: Defer       Family History:   Suicide Attempts: Denies  Suicide Completions: Denies      Family History   Problem Relation Age of Onset    Dementia Mother     Cancer Mother     Stroke Father     Heart disease Father     Dementia Brother     Early death Daughter     Cancer Daughter        Developmental History:   [place]  Siblings: Multiple  Childhood: Denies  High School: Graduate  College: Graduate    Access to Firearms: Denies    Mental Status Exam:   Hygiene:   Good  Cooperation:   Cooperative  Eye Contact:  Good  Psychomotor Behavior: Appropriate  Affect:  Appropriate  Mood: euthymic  Hopelessness: Optimistic  Speech:  Normal  Thought Process:  Goal directed  Thought Content:  Normal  Suicidal:  Denies  Homicidal:  Denies  Hallucinations:  Denies  Delusion:  Denies  Memory:  Intact  Orientation:  Person, place, time and situation  Reliability:  Good  Insight:  Good  Judgement:  Good  Impulse Control:  Good  Physical/Medical Issues:  No    Review of Systems:  Review of Systems   Constitutional:  Negative for appetite change, diaphoresis, fatigue and unexpected weight change.   HENT:  Negative for drooling, tinnitus and trouble swallowing.    Eyes:  Negative for visual disturbance.   Respiratory:  Negative for cough, chest tightness and shortness of breath.    Cardiovascular:  Negative for chest pain and palpitations.   Gastrointestinal:  Negative for abdominal pain, constipation, diarrhea, nausea and vomiting.   Endocrine: Negative for cold intolerance and heat intolerance.   Genitourinary:  Negative for difficulty urinating.   Musculoskeletal:  Negative for arthralgias and myalgias.   Skin:  Negative for rash.   Allergic/Immunologic: Negative for immunocompromised state.   Neurological:  Negative for dizziness, tremors, seizures and headaches.   Psychiatric/Behavioral:  Negative for agitation, dysphoric mood, hallucinations, self-injury, sleep disturbance and suicidal ideas. The patient is not nervous/anxious.        Vital Signs:   There were no vitals taken for this visit.     Lab Results:   Clinical Support on 12/16/2022   Component Date Value Ref Range Status    Glucose 12/16/2022 76  65 - 99 mg/dL Final    BUN 12/16/2022 10  8 - 23 mg/dL Final    Creatinine 12/16/2022 0.87  0.76 - 1.27 mg/dL Final    Sodium 12/16/2022 140  136 - 145 mmol/L Final    Potassium 12/16/2022 4.7  3.5 - 5.2 mmol/L Final    Slight hemolysis detected by analyzer. Results may be affected.    Chloride 12/16/2022 107   98 - 107 mmol/L Final    CO2 12/16/2022 26.2  22.0 - 29.0 mmol/L Final    Calcium 12/16/2022 9.4  8.6 - 10.5 mg/dL Final    Total Protein 12/16/2022 6.2  6.0 - 8.5 g/dL Final    Albumin 12/16/2022 4.00  3.50 - 5.20 g/dL Final    ALT (SGPT) 12/16/2022 22  1 - 41 U/L Final    AST (SGOT) 12/16/2022 18  1 - 40 U/L Final    Alkaline Phosphatase 12/16/2022 98  39 - 117 U/L Final    Total Bilirubin 12/16/2022 0.3  0.0 - 1.2 mg/dL Final    Globulin 12/16/2022 2.2  gm/dL Final    A/G Ratio 12/16/2022 1.8  g/dL Final    BUN/Creatinine Ratio 12/16/2022 11.5  7.0 - 25.0 Final    Anion Gap 12/16/2022 6.8  5.0 - 15.0 mmol/L Final    eGFR 12/16/2022 95.8  >60.0 mL/min/1.73 Final    National Kidney Foundation and American Society of Nephrology (ASN) Task Force recommended calculation based on the Chronic Kidney Disease Epidemiology Collaboration (CKD-EPI) equation refit without adjustment for race.    Total Cholesterol 12/16/2022 132  0 - 200 mg/dL Final    Triglycerides 12/16/2022 54  0 - 150 mg/dL Final    HDL Cholesterol 12/16/2022 51  40 - 60 mg/dL Final    LDL Cholesterol  12/16/2022 69  0 - 100 mg/dL Final    VLDL Cholesterol 12/16/2022 12  5 - 40 mg/dL Final    LDL/HDL Ratio 12/16/2022 1.38   Final   Hospital Outpatient Visit on 10/31/2022   Component Date Value Ref Range Status    Creatinine 10/31/2022 0.90  mg/dL Final    Serial Number: [id number]:  [id number]    eGFR 10/31/2022 94.8  >60.0 mL/min/1.73 Final   Ancillary Procedure on 08/30/2022   Component Date Value Ref Range Status    Target HR (85%) 08/30/2022 132  bpm Final    Max. Pred. HR (100%) 08/30/2022 155  bpm Final    Prox CCA PSV 08/30/2022 128  cm/sec Final    Prox CCA EDV 08/30/2022 24  cm/sec Final    Right Mid CCA PSV 08/30/2022 101  cm/sec Final    right Mid CCA EDV 08/30/2022 18  cm/sec Final    Dist CCA PSV 08/30/2022 102  cm/sec Final    Dist CCA EDV 08/30/2022 18  cm/sec Final    Prox ECA PSV 08/30/2022 97  cm/sec Final    Prox ECA EDV 08/30/2022 11   cm/sec Final    Prox ICA PSV 08/30/2022 93  cm/sec Final    Prox ICA EDV 08/30/2022 27  cm/sec Final    Mid ICA PSV 08/30/2022 101  cm/sec Final    Mid ICA EDV 08/30/2022 26  cm/sec Final    Dist ICA PSV 08/30/2022 70  cm/sec Final    Dist ICA EDV 08/30/2022 22  cm/sec Final    Vertebral A PSV 08/30/2022 25  cm/sec Final    Vertebral A EDV 08/30/2022 6  cm/sec Final    Prox CCA PSV 08/30/2022 122  cm/sec Final    Prox CCA EDV 08/30/2022 19  cm/sec Final    left Mid CCA PSV 08/30/2022 108  cm/sec Final    left Mid CCA EDV 08/30/2022 22  cm/sec Final    Dist CCA PSV 08/30/2022 109  cm/sec Final    Dist CCA EDV 08/30/2022 27  cm/sec Final    Prox ECA PSV 08/30/2022 101  cm/sec Final    Prox ECA EDV 08/30/2022 15  cm/sec Final    Prox ICA PSV 08/30/2022 63  cm/sec Final    Prox ICA EDV 08/30/2022 15  cm/sec Final    Mid ICA PSV 08/30/2022 120  cm/sec Final    Mid ICA EDV 08/30/2022 37  cm/sec Final    Dist ICA PSV 08/30/2022 83  cm/sec Final    Dist ICA EDV 08/30/2022 26  cm/sec Final    Vertebral A PSV 08/30/2022 45  cm/sec Final    Vertebral A EDV 08/30/2022 11  cm/sec Final   Ancillary Procedure on 08/30/2022   Component Date Value Ref Range Status    Target HR (85%) 08/30/2022 132  bpm Final    Max. Pred. HR (100%) 08/30/2022 155  bpm Final    Ascending aorta 08/30/2022 3.9  cm Final    IVRT 08/30/2022 63.0  msec Final    LA ESV Index (BP) 08/30/2022 17.0  ml/m2 Final    Avg E/e' ratio 08/30/2022 12.67   Final    Ao root diam 08/30/2022 3.6  cm Final    EF(MOD-bp) 08/30/2022 62.0  % Final    Lat Peak E' Enmanuel 08/30/2022 9.0  cm/sec Final    LVPWd 08/30/2022 0.9  cm Final    Med Peak E' Enmanuel 08/30/2022 6.00  cm/sec Final    MV dec time 08/30/2022 198  msec Final    IVSd 08/30/2022 1.0  cm Final    LA dimension (2D)  08/30/2022 3.7  cm Final    LVIDd 08/30/2022 4.8  cm Final    LVIDs 08/30/2022 2.8  cm Final    MV E/A 08/30/2022 1.5   Final    MV A max enmanuel 08/30/2022 65.0  cm/sec Final    MV E max enmanuel 08/30/2022 95.0   cm/sec Final    TAPSE (>1.6) 08/30/2022 2.30  cm Final       EKG Results:  No orders to display       Imaging Results:  XR Spine Cervical Complete 4 or 5 View (In Office)    Result Date: 3/14/2023   Degenerative disc disease at C5-C6     RASHAWN HAMMONDS MD       Electronically Signed and Approved By: RASHAWN HAMMONDS MD on 3/14/2023 at 11:29                 Assessment & Plan   Diagnoses and all orders for this visit:    1. Major depressive disorder, recurrent episode, moderate (Primary)    2. Generalized anxiety disorder    Educated about benefits and risks of antidepressant medication. Declines antidepressant medication at this time. Psychotherapy for depression and anxiety. Supportive psychotherapy with goal to strengthen defenses, promote problems solving, restore adaptive functioning and provide symptom relief. The therapeutic alliance was strengthened to encourage the patient to express their thoughts and feelings. Esteem building was enhanced through praise, reassurance, normalizing and encouragement. Stressors: Relationship. Coping skills were enhanced to build distress tolerance skills and emotional regulation. Coping skills utilized: Positive Distraction. Allowed patient to freely discuss issues without interruption or judgement with unconditional positive regard, active listening skills, and empathy. Current goal: Practice stress management techniques. Provided a safe, confidential environment to facilitate the development of a positive therapeutic relationship and encourage open, honest communication. Assisted patient in identifying risk factors which would indicate the need for higher level of care including thoughts to harm self or others and/or self-harming behavior and encouraged patient to contact this office, call 911, or present to the nearest emergency room should any of these events occur. Assisted patient in processing session content; acknowledged and normalized patient's thoughts, feelings, and  concerns by utilizing a person-centered approach in efforts to build appropriate rapport and a positive therapeutic relationship with open and honest communication. Patient given education on medication side effects, diagnosis/illness and relapse symptoms.  Plan to continue supportive psychotherapy in next appointment to provide symptom relief. At least 20 minutes of coping skill utilization recommended per day. 16 minutes of supportive psychotherapy. 4 weeks    Diagnoses: as above  Symptoms: as above  Functional status: good  Mental Status Exam: as above     Treatment plan: medication management and supportive psychotherapy  Prognosis: good  Progress: Continued Improvement    Visit Diagnoses:    ICD-10-CM ICD-9-CM   1. Major depressive disorder, recurrent episode, moderate  F33.1 296.32   2. Generalized anxiety disorder  F41.1 300.02         PLAN:  Safety: No acute safety concerns.   Therapy: Declines  Risk Assessment: Risk of self-harm acutely is moderate.  Risk factors include anxiety disorder, mood disorder, and recent psychosocial stressors (pandemic). Protective factors include no family history, denies access to guns/weapons, no present SI, no history of suicide attempts or self-harm in the past, minimal AODA, healthcare seeking, future orientation, willingness to engage in care.  Risk of self-harm chronically is also moderate, but could be further elevated in the event of treatment noncompliance and/or AODA.  Medications: No medication orders at this time  Labs/studies: No labs/studies ordered at this time  Follow-up: 4 weeks    Patient screened positive for depression based on a PHQ-9 score of 12 on 8/14/2023. Follow-up recommendations include: Suicide Risk Assessment performed.         TREATMENT PLAN/GOALS: Continue supportive psychotherapy efforts and medications as indicated. Treatment and medication options discussed during today's visit. Patient ackowledged and verbally consented to continue with  current treatment plan and was educated on the importance of compliance with treatment and follow-up appointments.      MEDICATION ISSUES:  WILY reviewed as expected.  Discussed medication options and treatment plan of prescribed medication as well as the risks, benefits, and side effects including potential falls, possible impaired driving and metabolic adversities among others. Patient is agreeable to call the office with any worsening of symptoms or onset of side effects. Patient is agreeable to call 911 or go to the nearest ER should he/she begin having SI/HI. No medication side effects or related complaints today.     MEDS ORDERED DURING VISIT:  No orders of the defined types were placed in this encounter.      Return in about 4 weeks (around 9/12/2023) for Next scheduled follow up.         This document has been electronically signed by ADRIENNE Dominguez  August 15, 2023 11:13 EDT      Part of this note may be an electronic transcription/translation of spoken language to printed text using the Dragon Dictation System.

## 2023-09-12 ENCOUNTER — TELEMEDICINE (OUTPATIENT)
Dept: PSYCHIATRY | Facility: CLINIC | Age: 68
End: 2023-09-12
Payer: MEDICARE

## 2023-09-12 DIAGNOSIS — F33.1 MAJOR DEPRESSIVE DISORDER, RECURRENT EPISODE, MODERATE: Primary | ICD-10-CM

## 2023-09-12 DIAGNOSIS — F41.1 GENERALIZED ANXIETY DISORDER: ICD-10-CM

## 2023-09-12 NOTE — PROGRESS NOTES
"Subjective   Toy Marroquin is a 68 y.o. male who presents today for follow up.   Mode of visit: Video  Location of provider: Home  Location of patient: Home  Does the patient consent to use a video/audio connection for your medical care today? Yes  The visit included audio and video interaction. No technical issues occurred during this visit.    Chief Complaint:  Depression, anxiety    History of Present Illness:     Depression/mood: has been low at times  Break up last time  Anniversary of daughter's passing  Anxiety: has been high at times  Excessive worries   Working on positive coping skills  Sleep disturbance: denies  Low energy: denies  Substance use: denies   Medication compliant  Side effects: denies  Refills needed    Coping skills: getting out to walk, paint, playing [employer], photography    6/15/23 Depression: Onset approximately 20 years ago (no specific triggers), increased approximately 5 years ago with the passing of his daughter. Increased past few years (\"world today\")  Depressed mood: endorses   Markedly diminished interest or pleasure: n  Significant weight loss when not dieting or weight gain, or decrease or increase in appetite: endorses increased appetite. Gained 4lbs in the past two months.   Insomnia or hypersomnia: endorses insomnia  Psychomotor agitation or retardation: n  Fatigue or loss of energy: endorses   Feelings of worthlessness or excessive or inappropriate guilt: endorses   Diminished ability to think or concentrate, or indecisiveness: endorses  Recurrent thoughts of death, recurrent suicidal ideation without a specific plan, or suicide attempt or specific plan for committing suicide- denies    Anxiety: Onset approximately 20 years ago, increased approximately 5 years ago (see above)  Anxious, nervous or worried on most days about a number of events or activities- endorses  No control over worries- endorses- working on positive coping skills  Irritability- endorses   Fatigue: see " above  Difficulty concentrating- see above  Sleep disturbance- see above  Restlessness- denies  Tension in muscles- denies    Psychiatric Review of Systems: Patient denies any current or previous hallucinations/delusions, paranoia, manic symptoms or PTSD.     PHQ-9 Depression Screening  Little interest or pleasure in doing things? 1-->several days   Feeling down, depressed, or hopeless? 1-->several days   Trouble falling or staying asleep, or sleeping too much? 1-->several days   Feeling tired or having little energy? 1-->several days   Poor appetite or overeating? 1-->several days   Feeling bad about yourself - or that you are a failure or have let yourself or your family down? 1-->several days   Trouble concentrating on things, such as reading the newspaper or watching television? 1-->several days   Moving or speaking so slowly that other people could have noticed? Or the opposite - being so fidgety or restless that you have been moving around a lot more than usual? 1-->several days   Thoughts that you would be better off dead, or of hurting yourself in some way? 0-->not at all   PHQ-9 Total Score 8   If you checked off any problems, how difficult have these problems made it for you to do your work, take care of things at home, or get along with other people? somewhat difficult         DONA-7  Feeling nervous, anxious or on edge: (P) Several days  Not being able to stop or control worrying: (P) Several days  Worrying too much about different things: (P) More than half the days  Trouble Relaxing: (P) More than half the days  Being so restless that it is hard to sit still: (P) More than half the days  Feeling afraid as if something awful might happen: (P) Several days  Becoming easily annoyed or irritable: (P) Several days  DONA 7 Total Score: (P) 10  If you checked any problems, how difficult have these problems made it for you to do your work, take care of things at home, or get along with other people: (P) Very  difficult      Past Surgical History:  Past Surgical History:   Procedure Laterality Date    INNER EAR SURGERY Right        Problem List:  Patient Active Problem List   Diagnosis    SOB (shortness of breath)    Palpitations    History of meningitis    Personal history of hearing loss    Carotid artery plaque, bilateral    Mixed hyperlipidemia    Memory difficulty       Allergy:   No Known Allergies     Discontinued Medications:  There are no discontinued medications.      Current Medications:   Current Outpatient Medications   Medication Sig Dispense Refill    aspirin 81 MG EC tablet Take 1 tablet by mouth Daily.      atorvastatin (Lipitor) 10 MG tablet Take 1 tablet by mouth Every Night. 90 tablet 1     No current facility-administered medications for this visit.       Past Medical History:  Past Medical History:   Diagnosis Date    History of meningitis     HL (hearing loss)        Past Psychiatric History:  Began Treatment: 2023  Diagnoses: Depression, anxiety  Psychiatrist: [facility]  Therapist: Denies  Admission History: Denies  Medication Trials: Denies  Self Harm: Denies  Suicide Attempts: Denies    Substance Abuse History:   Types: Denies  Withdrawal Symptoms: Not applicable  Longest Period Sober: Not applicable  AA: Not applicable    Social History:  Martial Status:   Employed: Artist  Kids: Four children  House: Self   History: Denies    Social History     Socioeconomic History    Marital status: Single   Tobacco Use    Smoking status: Never    Smokeless tobacco: Never   Vaping Use    Vaping Use: Never used   Substance and Sexual Activity    Alcohol use: Yes     Comment: occ    Drug use: Yes     Types: Marijuana    Sexual activity: Defer       Family History:   Suicide Attempts: Denies  Suicide Completions: Denies      Family History   Problem Relation Age of Onset    Dementia Mother     Cancer Mother     Stroke Father     Heart disease Father     Dementia Brother     Early death Daughter      Cancer Daughter        Developmental History:   [place]  Siblings: Multiple  Childhood: Denies  High School: Graduate  College: Graduate    Access to Firearms: Denies    Mental Status Exam:   Hygiene:   good  Cooperation:  Cooperative  Eye Contact:  Good  Psychomotor Behavior:  Appropriate  Affect:  Appropriate  Mood: euthymic  Hopelessness: Denies  Speech:  Normal  Thought Process:  Linear  Thought Content:  Mood congruent  Suicidal:  None  Homicidal:  None  Hallucinations:  None  Delusion:  None  Memory:  Intact  Orientation:  Person, Place, Time, and Situation  Reliability:  good  Insight:  Good  Judgement:  Good  Impulse Control:  Good  Physical/Medical Issues:  No      Review of Systems:  Review of Systems   Constitutional:  Negative for appetite change, diaphoresis, fatigue and unexpected weight change.   HENT:  Negative for drooling, tinnitus and trouble swallowing.    Eyes:  Negative for visual disturbance.   Respiratory:  Negative for cough, chest tightness and shortness of breath.    Cardiovascular:  Negative for chest pain and palpitations.   Gastrointestinal:  Negative for abdominal pain, constipation, diarrhea, nausea and vomiting.   Endocrine: Negative for cold intolerance and heat intolerance.   Genitourinary:  Negative for difficulty urinating.   Musculoskeletal:  Negative for arthralgias and myalgias.   Skin:  Negative for rash.   Allergic/Immunologic: Negative for immunocompromised state.   Neurological:  Negative for dizziness, tremors, seizures and headaches.   Psychiatric/Behavioral:  Negative for agitation, dysphoric mood, hallucinations, self-injury, sleep disturbance and suicidal ideas. The patient is not nervous/anxious.        Vital Signs:   There were no vitals taken for this visit.     Lab Results:   Clinical Support on 12/16/2022   Component Date Value Ref Range Status    Glucose 12/16/2022 76  65 - 99 mg/dL Final    BUN 12/16/2022 10  8 - 23 mg/dL Final    Creatinine 12/16/2022 0.87   0.76 - 1.27 mg/dL Final    Sodium 12/16/2022 140  136 - 145 mmol/L Final    Potassium 12/16/2022 4.7  3.5 - 5.2 mmol/L Final    Slight hemolysis detected by analyzer. Results may be affected.    Chloride 12/16/2022 107  98 - 107 mmol/L Final    CO2 12/16/2022 26.2  22.0 - 29.0 mmol/L Final    Calcium 12/16/2022 9.4  8.6 - 10.5 mg/dL Final    Total Protein 12/16/2022 6.2  6.0 - 8.5 g/dL Final    Albumin 12/16/2022 4.00  3.50 - 5.20 g/dL Final    ALT (SGPT) 12/16/2022 22  1 - 41 U/L Final    AST (SGOT) 12/16/2022 18  1 - 40 U/L Final    Alkaline Phosphatase 12/16/2022 98  39 - 117 U/L Final    Total Bilirubin 12/16/2022 0.3  0.0 - 1.2 mg/dL Final    Globulin 12/16/2022 2.2  gm/dL Final    A/G Ratio 12/16/2022 1.8  g/dL Final    BUN/Creatinine Ratio 12/16/2022 11.5  7.0 - 25.0 Final    Anion Gap 12/16/2022 6.8  5.0 - 15.0 mmol/L Final    eGFR 12/16/2022 95.8  >60.0 mL/min/1.73 Final    National Kidney Foundation and American Society of Nephrology (ASN) Task Force recommended calculation based on the Chronic Kidney Disease Epidemiology Collaboration (CKD-EPI) equation refit without adjustment for race.    Total Cholesterol 12/16/2022 132  0 - 200 mg/dL Final    Triglycerides 12/16/2022 54  0 - 150 mg/dL Final    HDL Cholesterol 12/16/2022 51  40 - 60 mg/dL Final    LDL Cholesterol  12/16/2022 69  0 - 100 mg/dL Final    VLDL Cholesterol 12/16/2022 12  5 - 40 mg/dL Final    LDL/HDL Ratio 12/16/2022 1.38   Final   Hospital Outpatient Visit on 10/31/2022   Component Date Value Ref Range Status    Creatinine 10/31/2022 0.90  mg/dL Final    Serial Number: [id number]:  [id number]    eGFR 10/31/2022 94.8  >60.0 mL/min/1.73 Final       EKG Results:  No orders to display       Imaging Results:  XR Spine Cervical Complete 4 or 5 View (In Office)    Result Date: 3/14/2023   Degenerative disc disease at C5-C6     RASHAWN HAMMONDS MD       Electronically Signed and Approved By: RASHAWN HAMMONDS MD on 3/14/2023 at 11:29              Assessment & Plan   Diagnoses and all orders for this visit:    1. Major depressive disorder, recurrent episode, moderate (Primary)    2. Generalized anxiety disorder    Educated about benefits and risks of antidepressant medication. Declines antidepressant medication at this time. Psychotherapy for depression and anxiety. Supportive psychotherapy with goal to strengthen defenses, promote problems solving, restore adaptive functioning and provide symptom relief. Stressors: Relationship.  Coping skills utilized: journaling, painting. Current goal: Practice stress management techniques. Coping skills were enhanced to build distress tolerance skills and emotional regulation. Assisted patient in identifying risk factors which would indicate the need for higher level of care including thoughts to harm self or others and/or self-harming behavior and encouraged patient to contact this office, call 911, or present to the nearest emergency room should any of these events occur. Patient given education on medication side effects, diagnosis/illness and relapse symptoms.  Plan to continue supportive psychotherapy in next appointment to provide symptom relief. At least 20 minutes of coping skill utilization recommended per day. 17 minutes of supportive psychotherapy. 4 weeks    Diagnoses: as above  Symptoms: as above  Functional status: good  Mental Status Exam: as above     Treatment plan: medication management and supportive psychotherapy  Prognosis: good  Progress: Depression s/sx and Anxiety s/sx    Visit Diagnoses:    ICD-10-CM ICD-9-CM   1. Major depressive disorder, recurrent episode, moderate  F33.1 296.32   2. Generalized anxiety disorder  F41.1 300.02     PLAN:  Safety: No acute safety concerns.   Therapy: Declines  Risk Assessment: Risk of self-harm acutely is moderate.  Risk factors include anxiety disorder, mood disorder, and recent psychosocial stressors (pandemic). Protective factors include no family history,  denies access to guns/weapons, no present SI, no history of suicide attempts or self-harm in the past, minimal AODA, healthcare seeking, future orientation, willingness to engage in care.  Risk of self-harm chronically is also moderate, but could be further elevated in the event of treatment noncompliance and/or AODA.  Medications: No medication orders at this time  Labs/studies: No labs/studies ordered at this time  Follow-up: 4 weeks    Patient screened positive for depression based on a PHQ-9 score of 8 on 9/12/2023. Follow-up recommendations include: Suicide Risk Assessment performed.         TREATMENT PLAN/GOALS: Continue supportive psychotherapy efforts and medications as indicated. Treatment and medication options discussed during today's visit. Patient ackowledged and verbally consented to continue with current treatment plan and was educated on the importance of compliance with treatment and follow-up appointments.      MEDICATION ISSUES:  WILY reviewed as expected.  Discussed medication options and treatment plan of prescribed medication as well as the risks, benefits, and side effects including potential falls, possible impaired driving and metabolic adversities among others. Patient is agreeable to call the office with any worsening of symptoms or onset of side effects. Patient is agreeable to call 911 or go to the nearest ER should he/she begin having SI/HI. No medication side effects or related complaints today.     MEDS ORDERED DURING VISIT:  No orders of the defined types were placed in this encounter.      Return in about 4 weeks (around 10/10/2023) for Next scheduled follow up.         This document has been electronically signed by ADRIENNE Dominguez  September 12, 2023 11:44 EDT      Part of this note may be an electronic transcription/translation of spoken language to printed text using the Dragon Dictation System.

## 2023-10-17 ENCOUNTER — TELEMEDICINE (OUTPATIENT)
Dept: PSYCHIATRY | Facility: CLINIC | Age: 68
End: 2023-10-17
Payer: MEDICARE

## 2023-10-17 DIAGNOSIS — F33.1 MAJOR DEPRESSIVE DISORDER, RECURRENT EPISODE, MODERATE: Primary | ICD-10-CM

## 2023-10-17 DIAGNOSIS — F41.1 GENERALIZED ANXIETY DISORDER: ICD-10-CM

## 2023-10-17 NOTE — PROGRESS NOTES
"Subjective   Toy Marroquin is a 68 y.o. male who presents today for follow up.   Mode of visit: Video  Location of provider: Home  Location of patient: Home  Does the patient consent to use a video/audio connection for your medical care today? Yes  The visit included audio and video interaction. No technical issues occurred during this visit.    Chief Complaint:  Depression, anxiety    History of Present Illness:     Depression/mood: has improved  \"Better than it was\"  Feeling down at times  Low interest  Anxiety: has improved  Excessive worries, trouble relaxing at times  Working on positive coping skills  Sleep disturbance: denies  Low energy: denies  Substance use: denies   Medication compliant  Side effects: denies  Refills needed    Coping skills: getting out to walk, paint, playing Yapper, photography    6/15/23 Depression: Onset approximately 20 years ago (no specific triggers), increased approximately 5 years ago with the passing of his daughter. Increased past few years (\"world today\")  Depressed mood: endorses   Markedly diminished interest or pleasure: n  Significant weight loss when not dieting or weight gain, or decrease or increase in appetite: endorses increased appetite. Gained 4lbs in the past two months.   Insomnia or hypersomnia: endorses insomnia  Psychomotor agitation or retardation: n  Fatigue or loss of energy: endorses   Feelings of worthlessness or excessive or inappropriate guilt: endorses   Diminished ability to think or concentrate, or indecisiveness: endorses  Recurrent thoughts of death, recurrent suicidal ideation without a specific plan, or suicide attempt or specific plan for committing suicide- denies    Anxiety: Onset approximately 20 years ago, increased approximately 5 years ago (see above)  Anxious, nervous or worried on most days about a number of events or activities- endorses  No control over worries- endorses- working on positive coping skills  Irritability- endorses "   Fatigue: see above  Difficulty concentrating- see above  Sleep disturbance- see above  Restlessness- denies  Tension in muscles- denies    Psychiatric Review of Systems: Patient denies any current or previous hallucinations/delusions, paranoia, manic symptoms or PTSD.     PHQ-9 Depression Screening  Little interest or pleasure in doing things? (P) 1-->several days   Feeling down, depressed, or hopeless? (P) 1-->several days   Trouble falling or staying asleep, or sleeping too much? (P) 1-->several days   Feeling tired or having little energy? (P) 1-->several days   Poor appetite or overeating? (P) 2-->more than half the days   Feeling bad about yourself - or that you are a failure or have let yourself or your family down? (P) 2-->more than half the days   Trouble concentrating on things, such as reading the newspaper or watching television? (P) 3-->nearly every day   Moving or speaking so slowly that other people could have noticed? Or the opposite - being so fidgety or restless that you have been moving around a lot more than usual? (P) 1-->several days   Thoughts that you would be better off dead, or of hurting yourself in some way? (P) 1-->several days   PHQ-9 Total Score (P) 13   If you checked off any problems, how difficult have these problems made it for you to do your work, take care of things at home, or get along with other people? (P) somewhat difficult     DONA-7  Feeling nervous, anxious or on edge: (P) Several days  Not being able to stop or control worrying: (P) Several days  Worrying too much about different things: (P) Several days  Trouble Relaxing: (P) More than half the days  Being so restless that it is hard to sit still: (P) Several days  Feeling afraid as if something awful might happen: (P) Several days  Becoming easily annoyed or irritable: (P) Several days  DONA 7 Total Score: (P) 8  If you checked any problems, how difficult have these problems made it for you to do your work, take care of  things at home, or get along with other people: (P) Somewhat difficult      Past Surgical History:  Past Surgical History:   Procedure Laterality Date    INNER EAR SURGERY Right        Problem List:  Patient Active Problem List   Diagnosis    SOB (shortness of breath)    Palpitations    History of meningitis    Personal history of hearing loss    Carotid artery plaque, bilateral    Mixed hyperlipidemia    Memory difficulty       Allergy:   No Known Allergies     Discontinued Medications:  There are no discontinued medications.      Current Medications:   Current Outpatient Medications   Medication Sig Dispense Refill    aspirin 81 MG EC tablet Take 1 tablet by mouth Daily.      atorvastatin (Lipitor) 10 MG tablet Take 1 tablet by mouth Every Night. 90 tablet 1     No current facility-administered medications for this visit.       Past Medical History:  Past Medical History:   Diagnosis Date    History of meningitis     HL (hearing loss)        Past Psychiatric History:  Began Treatment: 2023  Diagnoses: Depression, anxiety  Psychiatrist: Esequiel  Therapist: Denies  Admission History: Denies  Medication Trials: Denies  Self Harm: Denies  Suicide Attempts: Denies    Substance Abuse History:   Types: Denies  Withdrawal Symptoms: Not applicable  Longest Period Sober: Not applicable  AA: Not applicable    Social History:  Martial Status:   Employed: Artist  Kids: Four children  House: Self   History: Denies    Social History     Socioeconomic History    Marital status: Single   Tobacco Use    Smoking status: Never    Smokeless tobacco: Never   Vaping Use    Vaping Use: Never used   Substance and Sexual Activity    Alcohol use: Yes     Comment: occ    Drug use: Yes     Types: Marijuana    Sexual activity: Defer       Family History:   Suicide Attempts: Denies  Suicide Completions: Denies      Family History   Problem Relation Age of Onset    Dementia Mother     Cancer Mother     Stroke Father     Heart  disease Father     Dementia Brother     Early death Daughter     Cancer Daughter        Developmental History:   Born: [place]  Siblings: Multiple  Childhood: Denies  High School: Graduate  College: Graduate    Access to Firearms: Denies    Mental Status Exam:   Hygiene:   good  Cooperation:  Cooperative  Eye Contact:  Good  Psychomotor Behavior:  Appropriate  Affect:  Appropriate  Mood: euthymic  Hopelessness: Denies  Speech:  Normal  Thought Process:  Linear  Thought Content:  Mood congruent  Suicidal:  denies  Homicidal:  denies  Hallucinations:  denies  Delusion:  None  Memory:  Intact  Orientation:  Person, Place, Time, and Situation  Reliability:  good  Insight:  Good  Judgement:  Good  Impulse Control:  Good  Physical/Medical Issues:  No      Review of Systems:  Review of Systems   Constitutional:  Negative for appetite change, diaphoresis, fatigue and unexpected weight change.   HENT:  Negative for drooling, tinnitus and trouble swallowing.    Eyes:  Negative for visual disturbance.   Respiratory:  Negative for cough, chest tightness and shortness of breath.    Cardiovascular:  Negative for chest pain and palpitations.   Gastrointestinal:  Negative for abdominal pain, constipation, diarrhea, nausea and vomiting.   Endocrine: Negative for cold intolerance and heat intolerance.   Genitourinary:  Negative for difficulty urinating.   Musculoskeletal:  Negative for arthralgias and myalgias.   Skin:  Negative for rash.   Allergic/Immunologic: Negative for immunocompromised state.   Neurological:  Negative for dizziness, tremors, seizures and headaches.   Psychiatric/Behavioral:  Negative for agitation, dysphoric mood, hallucinations, self-injury, sleep disturbance and suicidal ideas. The patient is not nervous/anxious.      Vital Signs:   There were no vitals taken for this visit.     Lab Results:   Clinical Support on 12/16/2022   Component Date Value Ref Range Status    Glucose 12/16/2022 76  65 - 99 mg/dL Final     BUN 12/16/2022 10  8 - 23 mg/dL Final    Creatinine 12/16/2022 0.87  0.76 - 1.27 mg/dL Final    Sodium 12/16/2022 140  136 - 145 mmol/L Final    Potassium 12/16/2022 4.7  3.5 - 5.2 mmol/L Final    Slight hemolysis detected by analyzer. Results may be affected.    Chloride 12/16/2022 107  98 - 107 mmol/L Final    CO2 12/16/2022 26.2  22.0 - 29.0 mmol/L Final    Calcium 12/16/2022 9.4  8.6 - 10.5 mg/dL Final    Total Protein 12/16/2022 6.2  6.0 - 8.5 g/dL Final    Albumin 12/16/2022 4.00  3.50 - 5.20 g/dL Final    ALT (SGPT) 12/16/2022 22  1 - 41 U/L Final    AST (SGOT) 12/16/2022 18  1 - 40 U/L Final    Alkaline Phosphatase 12/16/2022 98  39 - 117 U/L Final    Total Bilirubin 12/16/2022 0.3  0.0 - 1.2 mg/dL Final    Globulin 12/16/2022 2.2  gm/dL Final    A/G Ratio 12/16/2022 1.8  g/dL Final    BUN/Creatinine Ratio 12/16/2022 11.5  7.0 - 25.0 Final    Anion Gap 12/16/2022 6.8  5.0 - 15.0 mmol/L Final    eGFR 12/16/2022 95.8  >60.0 mL/min/1.73 Final    National Kidney Foundation and American Society of Nephrology (ASN) Task Force recommended calculation based on the Chronic Kidney Disease Epidemiology Collaboration (CKD-EPI) equation refit without adjustment for race.    Total Cholesterol 12/16/2022 132  0 - 200 mg/dL Final    Triglycerides 12/16/2022 54  0 - 150 mg/dL Final    HDL Cholesterol 12/16/2022 51  40 - 60 mg/dL Final    LDL Cholesterol  12/16/2022 69  0 - 100 mg/dL Final    VLDL Cholesterol 12/16/2022 12  5 - 40 mg/dL Final    LDL/HDL Ratio 12/16/2022 1.38   Final   Hospital Outpatient Visit on 10/31/2022   Component Date Value Ref Range Status    Creatinine 10/31/2022 0.90  mg/dL Final    Serial Number: [id number]:  [id number]    eGFR 10/31/2022 94.8  >60.0 mL/min/1.73 Final       EKG Results:  No orders to display       Imaging Results:  XR Spine Cervical Complete 4 or 5 View (In Office)    Result Date: 3/14/2023   Degenerative disc disease at C5-C6     RASHAWN HAMMONDS MD       Electronically Signed and  Approved By: RASHAWN HAMMONDS MD on 3/14/2023 at 11:29             Assessment & Plan   Diagnoses and all orders for this visit:    1. Major depressive disorder, recurrent episode, moderate (Primary)    2. Generalized anxiety disorder      Educated about benefits and risks of antidepressant medication. Declines antidepressant medication at this time. Psychotherapy for depression and anxiety.     Visit Diagnoses:    ICD-10-CM ICD-9-CM   1. Major depressive disorder, recurrent episode, moderate  F33.1 296.32   2. Generalized anxiety disorder  F41.1 300.02       PLAN:  Safety: No acute safety concerns.   Therapy: Declines  Risk Assessment: Risk of self-harm acutely is moderate.  Risk factors include anxiety disorder, mood disorder, and recent psychosocial stressors (pandemic). Protective factors include no family history, denies access to guns/weapons, no present SI, no history of suicide attempts or self-harm in the past, minimal AODA, healthcare seeking, future orientation, willingness to engage in care.  Risk of self-harm chronically is also moderate, but could be further elevated in the event of treatment noncompliance and/or AODA.  Medications: No medication orders at this time  Labs/studies: No labs/studies ordered at this time  Follow-up: 4 weeks    Patient screened positive for depression based on a PHQ-9 score of 13 on 10/17/2023. Follow-up recommendations include: Suicide Risk Assessment performed.     TREATMENT PLAN/GOALS: Continue supportive psychotherapy efforts and medications as indicated. Treatment and medication options discussed during today's visit. Patient ackowledged and verbally consented to continue with current treatment plan and was educated on the importance of compliance with treatment and follow-up appointments.    MEDICATION ISSUES:  WILY reviewed as expected.  Discussed medication options and treatment plan of prescribed medication as well as the risks, benefits, and side effects including  potential falls, possible impaired driving and metabolic adversities among others. Patient is agreeable to call the office with any worsening of symptoms or onset of side effects. Patient is agreeable to call 911 or go to the nearest ER should he/she begin having SI/HI. No medication side effects or related complaints today.     MEDS ORDERED DURING VISIT:  No orders of the defined types were placed in this encounter.      Return in about 4 weeks (around 11/14/2023) for Next scheduled follow up.         This document has been electronically signed by ADREINNE Dominguez  October 17, 2023 11:11 EDT      Part of this note may be an electronic transcription/translation of spoken language to printed text using the Dragon Dictation System.

## 2023-10-17 NOTE — PSYCHOTHERAPY NOTE
Supportive psychotherapy with goal to strengthen defenses, promote problems solving, restore adaptive functioning and provide symptom relief. Stressors: found out not broken up with girlfriend, but girlfriend will be spending holidays with her daughter.  Coping skills utilized: Positive Thoughts. Current goal: Use positive affirmations. Provided a safe, confidential environment to facilitate the development of a positive therapeutic relationship and encourage open, honest communication..18 minutes of supportive psychotherapy.     Diagnoses: see next note  Symptoms: see next note  Functional status: good  Mental Status Exam: see next note     Treatment plan: medication management and supportive psychotherapy  Prognosis: good  Progress: Continued Improvement

## 2023-10-25 ENCOUNTER — TELEPHONE (OUTPATIENT)
Dept: NEUROLOGY | Facility: CLINIC | Age: 68
End: 2023-10-25
Payer: MEDICARE

## 2023-10-25 NOTE — TELEPHONE ENCOUNTER
"PATIENT CALLING TO ASK PROVIDER TO WRITE A PRESCRIPTION FOR PREVAGEN  AND SEND TO HIS PHARMACY    [id number] - [place], [place] - [address] AT [place] [address] [facility] - [phone] [facility] [phone] FX      THANK YOU   "

## 2023-10-25 NOTE — TELEPHONE ENCOUNTER
Spoke with pt on the phone and let him know Alysha does not prescribe prevagen and can discuss further at next appt. Verbalized understanding.   Pt stated he has been taking it as an OTC but the cost has gone up.

## 2023-11-14 ENCOUNTER — TELEMEDICINE (OUTPATIENT)
Dept: PSYCHIATRY | Facility: CLINIC | Age: 68
End: 2023-11-14
Payer: MEDICARE

## 2023-11-14 DIAGNOSIS — F33.1 MAJOR DEPRESSIVE DISORDER, RECURRENT EPISODE, MODERATE: Primary | ICD-10-CM

## 2023-11-14 DIAGNOSIS — F41.1 GENERALIZED ANXIETY DISORDER: ICD-10-CM

## 2023-11-14 NOTE — PROGRESS NOTES
"This provider is located at the [facility] (through [facility]), [address], [place] [place], [address] using a secure [employer] Video Visit through [employer]. Patient is being seen remotely via telehealth at their home address in [place], and stated they are in a secure environment for this session. The patient's condition being diagnosed/treated is appropriate for telemedicine. The provider identified himself as well as his credentials.   The patient consent to be seen remotely, and when consent is given they understand that the consent allows for patient identifiable information to be sent to a third party as needed.   They may refuse to be seen remotely at any time. The electronic data is encrypted and password protected, and the patient  has been advised of the potential risks to privacy not withstanding such measures.    You have chosen to receive care through a telehealth visit.  Do you consent to use a video/audio connection for your medical care today? Yes    Patient identifiers utilized: Name and date of birth.    Patient verbally confirmed consent for today's encounter- yes    Subjective   Toy Marroquin is a 68 y.o. male who presents today for follow-up appointment.     Chief Complaint:  Depression, anxiety    History of Present Illness:     Depression/mood  \"Doing well\"  Working on quitting smoking- 2 weeks   Whole family going to see brother for Thanksgiving  Last time all together 6-7 years  Anxiety  Has improved  Denies excessive worries   Working on positive coping skills  Sleep disturbance: n  Low energy: n  Substance use: n  Side effects: denies  Refills needed    Prior Psychiatric Medications:  Denies    The following portions of the patient's history were reviewed and updated as appropriate: allergies, current medications, past family history, past medical history, past social history, past surgical history and problem list.    Allergy:   No Known Allergies "     Current Medications:   Current Outpatient Medications   Medication Sig Dispense Refill    aspirin 81 MG EC tablet Take 1 tablet by mouth Daily.      atorvastatin (Lipitor) 10 MG tablet Take 1 tablet by mouth Every Night. 90 tablet 1     No current facility-administered medications for this visit.       Mental Status Exam:   Hygiene:   good  Cooperation:  Cooperative  Eye Contact:  Good  Psychomotor Behavior:  Appropriate  Affect:  Full range  Mood: normal  Hopelessness: Denies  Speech:  Normal  Thought Process:  Goal directed  Thought Content:  Normal  Suicidal:  None  Homicidal:  None  Hallucinations:  None  Delusion:  None  Memory:  Intact  Orientation:  Person, Place, Time, and Situation  Reliability:  good  Insight:  Good  Judgement:  Good  Impulse Control:  Good  Physical/Medical Issues:  No      Physical Exam:   There were no vitals taken for this visit. There is no height or weight on file to calculate BMI.   Due to the remote nature of this encounter (virtual encounter), vitals were unable to be obtained.  Weight change: n    PHQ-9 Depression Screening  Little interest or pleasure in doing things? (P) 2-->more than half the days   Feeling down, depressed, or hopeless? (P) 2-->more than half the days   Trouble falling or staying asleep, or sleeping too much? (P) 3-->nearly every day   Feeling tired or having little energy? (P) 3-->nearly every day   Poor appetite or overeating? (P) 2-->more than half the days   Feeling bad about yourself - or that you are a failure or have let yourself or your family down? (P) 2-->more than half the days   Trouble concentrating on things, such as reading the newspaper or watching television? (P) 3-->nearly every day   Moving or speaking so slowly that other people could have noticed? Or the opposite - being so fidgety or restless that you have been moving around a lot more than usual? (P) 2-->more than half the days   Thoughts that you would be better off dead, or of  hurting yourself in some way? (P) 1-->several days   PHQ-9 Total Score (P) 20   If you checked off any problems, how difficult have these problems made it for you to do your work, take care of things at home, or get along with other people? (P) very difficult     PHQ-9 Total Score: (P) 20    Feeling nervous, anxious or on edge: (P) More than half the days  Not being able to stop or control worrying: (P) More than half the days  Worrying too much about different things: (P) More than half the days  Trouble Relaxing: (P) Nearly every day  Being so restless that it is hard to sit still: (P) More than half the days  Feeling afraid as if something awful might happen: (P) Several days  Becoming easily annoyed or irritable: (P) Several days  DONA 7 Total Score: (P) 13  If you checked any problems, how difficult have these problems made it for you to do your work, take care of things at home, or get along with other people: (P) Very difficult    Previous available Provider notes and records reviewed by this APRN at today's encounter.     Visit Diagnoses:    ICD-10-CM ICD-9-CM   1. Major depressive disorder, recurrent episode, moderate  F33.1 296.32   2. Generalized anxiety disorder  F41.1 300.02       TREATMENT PLAN: Continue supportive psychotherapy efforts and medications as indicated.  Medication and treatment options, both pharmacological and non-pharmacological treatment options, discussed during today's visit, including any off label use of medication. Patient acknowledged and verbally consented with current treatment plan and was educated on the importance of compliance with treatment and follow-up appointments.      - Educated on risks and benefits of antidepressant medication but patient declines at this time, not wanting to start a medication.     Labs: None ordered at this time  Therapy: Defers    MEDICATION ISSUES:  Discussed treatment plan and medication options of prescribed medication as well as the risks,  benefits, any black box warnings, and side effects including potential falls, possible impaired driving, and metabolic adversities among others, including any off label use of medication. Patient is agreeable to call the office with any worsening of symptoms or onset of side effects, or if any concerns or questions arise.  The contact information for the office is made available to the patient. Patient is agreeable to call 911 or go to the nearest ER should they begin having any SI/HI, or if any urgent concerns arise. No medication side effects or related complaints today. WILY reviewed as expected.    RISK ASSESSMENT:  Risk of self-harm acutely is moderate.  Risk factors include anxiety disorder, mood disorder, and recent psychosocial stressors (pandemic). Protective factors include no family history, denies access to guns/weapons, no present SI, no history of suicide attempts or self-harm in the past, minimal AODA, healthcare seeking, future orientation, willingness to engage in care.  Risk of self-harm chronically is also moderate, but could be further elevated in the event of treatment noncompliance and/or AODA.    VERBAL INFORMED CONSENT FOR MEDICATION:  The patient was educated that their proposed/prescribed psychotropic medication(s) has potential risks, side effects, adverse effects, and black box warnings; and these have been discussed with the patient.  The patient has been informed that their treatment and medication dosage is to be individualized, and may even be above or below the recommended range/dosage due to patient individualization and response, but medication is prescribed using a shared decision making approach, and no medication or dosage will be prescribed without the patient's verbal consent.  The reason for the use of the medication including any off label use and alternative modes of treatment other than or in addition to medication has been considered and discussed, the probable  consequences of not receiving the proposed treatment have been discussed, and any treatment side effects, black box warnings, and cautions associated with treatment have been discussed with the patient.  The patient is allowed ample time to openly discuss and ask questions regarding the proposed medication(s) and treatment plan and the patient verbalizes understanding the reasons for the use of the medication, its potential risks and benefits, other alternative treatment(s), and the probable consequences that may occur if the proposed medication is not given.  The patient has been given ample time to ask questions and study the information and find the information to be specific, accurate, and complete.  The patient gives verbal consent for the medication(s) proposed/prescribed, they verbalized understanding that they can refuse and withdraw consent at any time with the assistance of this APRN, and the patient has verbally confirmed that they are aware, and are willing, to take the prescribed medication and follow the treatment plan with the known possible risks, side effect, black box warnings, and any potential medication interactions, and the patient reports they will be worse off without this medication and treatment plan.  The patient is advised to contact this APRN/this office if any questions or concerns arise at any time (at [phone]), or call 911/go to the closest emergency department if needed or outside of office hours.      [facility] No Show Policy:  We understand unexpected circumstances arise; however, anytime you miss your appointment we are unable to provide you appropriate care.  In addition, each appointment missed could have been used to provide care for others.  We ask that you call at least 24 hours in advance to cancel or reschedule an appointment.  We would like to take this opportunity to remind you of our policy stating patients who miss THREE or more  appointments without cancelling or rescheduling 24 hours in advance of the appointment may be subject to cancellation of any further visits with our clinic and recommendation to seek in-person services/visits.    Please call [phone] to reschedule your appointment. If there are reasons that make it difficult for you to keep the appointments, please call and let us know how we can help.  Please understand that medication prescribing will not continue without seeing your provider.      [facility]'s No Show Policy reviewed with patient at today's visit. Patient verbalized understanding of this policy. Discussed with patient that in the event that there are three or more no show visits, it will be recommended that they pursue in-person services/visits as noncompliance with telehealth visits indicates that patient is not an appropriate candidate for telemedicine and would likely be more appropriate for in-person services/visits. Patient verbalizes understanding and is agreeable to this.    MEDS ORDERED DURING VISIT:  No orders of the defined types were placed in this encounter.      Return in about 12 weeks (around 2/6/2024) for Next scheduled follow up.         Progress toward goal: At goal    Functional Status: No impairment    Prognosis: Good with Ongoing Treatment     This document has been electronically signed by ADRIENNE Dominguez  November 14, 2023 11:07 EST      Please note that portions of this note were completed with a voice recognition program.

## 2023-12-15 ENCOUNTER — PATIENT MESSAGE (OUTPATIENT)
Dept: NEUROLOGY | Facility: CLINIC | Age: 68
End: 2023-12-15
Payer: MEDICARE

## 2024-02-15 ENCOUNTER — OFFICE VISIT (OUTPATIENT)
Dept: FAMILY MEDICINE CLINIC | Facility: CLINIC | Age: 69
End: 2024-02-15
Payer: MEDICARE

## 2024-02-15 VITALS
DIASTOLIC BLOOD PRESSURE: 72 MMHG | TEMPERATURE: 97.3 F | SYSTOLIC BLOOD PRESSURE: 128 MMHG | WEIGHT: 195 LBS | HEIGHT: 67 IN | BODY MASS INDEX: 30.61 KG/M2 | HEART RATE: 74 BPM | OXYGEN SATURATION: 97 %

## 2024-02-15 DIAGNOSIS — Z28.21 PNEUMOCOCCAL VACCINATION DECLINED: ICD-10-CM

## 2024-02-15 DIAGNOSIS — Z00.00 MEDICARE ANNUAL WELLNESS VISIT, SUBSEQUENT: Primary | ICD-10-CM

## 2024-02-15 DIAGNOSIS — E78.2 MIXED HYPERLIPIDEMIA: ICD-10-CM

## 2024-02-15 DIAGNOSIS — Z28.21 INFLUENZA VACCINATION DECLINED: ICD-10-CM

## 2024-02-15 DIAGNOSIS — Z23 NEED FOR SHINGLES VACCINE: ICD-10-CM

## 2024-02-15 DIAGNOSIS — I65.23 CAROTID ARTERY PLAQUE, BILATERAL: ICD-10-CM

## 2024-02-15 RX ORDER — ATORVASTATIN CALCIUM 10 MG/1
10 TABLET, FILM COATED ORAL NIGHTLY
Qty: 90 TABLET | Refills: 1 | Status: SHIPPED | OUTPATIENT
Start: 2024-02-15

## 2024-03-07 ENCOUNTER — OFFICE VISIT (OUTPATIENT)
Dept: NEUROLOGY | Facility: CLINIC | Age: 69
End: 2024-03-07
Payer: MEDICARE

## 2024-03-07 VITALS
DIASTOLIC BLOOD PRESSURE: 98 MMHG | WEIGHT: 196.2 LBS | SYSTOLIC BLOOD PRESSURE: 138 MMHG | HEIGHT: 67 IN | BODY MASS INDEX: 30.79 KG/M2 | HEART RATE: 76 BPM

## 2024-03-07 DIAGNOSIS — F41.9 ANXIETY AND DEPRESSION: ICD-10-CM

## 2024-03-07 DIAGNOSIS — G31.84 MILD COGNITIVE IMPAIRMENT: Primary | ICD-10-CM

## 2024-03-07 DIAGNOSIS — F32.A ANXIETY AND DEPRESSION: ICD-10-CM

## 2024-03-07 NOTE — PROGRESS NOTES
"Chief Complaint  Neurologic Problem    Subjective          Toy Marroquin presents to [facility]  History of Present Illness  Following up for memory. Feels as if he's had some mild improvement in memory since previous visit. Has been seeing psychiatry as recommended.  He feels he would benefit from more frequent therapy sessions.       Objective   Vital Signs:   /98   Pulse 76   Ht 170.2 cm (67\")   Wt 89 kg (196 lb 3.2 oz)   BMI 30.73 kg/m²     Physical Exam  HENT:      Head: Normocephalic.   Pulmonary:      Effort: Pulmonary effort is normal.   Neurological:      Mental Status: He is alert and oriented to person, place, and time.      Sensory: Sensation is intact.      Motor: Motor function is intact.      Coordination: Coordination is intact.      Deep Tendon Reflexes: Reflexes are normal and symmetric.        Neurologic Exam     Mental Status   Oriented to person, place, and time.        Result Review :     Data reviewed : Consultant notes Psychiatry           Assessment and Plan    Diagnoses and all orders for this visit:    1. Mild cognitive impairment (Primary)  Assessment & Plan:   Recommended marijuana cessation. Will continue to monitor for progression of memory loss.  Continue aspirin and atrovastatin for vascular risk reduction.  Will reorder neurocognitive testing in follow-up.       2. Anxiety and depression  Assessment & Plan:  Continue to follow with psychiatry. Recommended he speak to them regarding a referral to a therapist.           Follow Up   Return in about 6 months (around 9/7/2024).  Patient was given instructions and counseling regarding his condition or for health maintenance advice. Please see specific information pulled into the AVS if appropriate.       "

## 2024-03-08 PROBLEM — F32.A ANXIETY AND DEPRESSION: Status: ACTIVE | Noted: 2024-03-08

## 2024-03-08 PROBLEM — F41.9 ANXIETY AND DEPRESSION: Status: ACTIVE | Noted: 2024-03-08

## 2024-03-08 NOTE — ASSESSMENT & PLAN NOTE
Continue to follow with psychiatry. Recommended he speak to them regarding a referral to a therapist.

## 2024-03-08 NOTE — ASSESSMENT & PLAN NOTE
Recommended marijuana cessation. Will continue to monitor for progression of memory loss.  Continue aspirin and atrovastatin for vascular risk reduction.  Will reorder neurocognitive testing in follow-up.

## 2024-09-12 DIAGNOSIS — I65.23 CAROTID ARTERY PLAQUE, BILATERAL: ICD-10-CM

## 2024-09-12 DIAGNOSIS — E78.2 MIXED HYPERLIPIDEMIA: ICD-10-CM

## 2024-09-13 RX ORDER — ATORVASTATIN CALCIUM 10 MG/1
10 TABLET, FILM COATED ORAL
Qty: 90 TABLET | Refills: 1 | OUTPATIENT
Start: 2024-09-13